# Patient Record
Sex: FEMALE | Race: WHITE | NOT HISPANIC OR LATINO | Employment: OTHER | URBAN - METROPOLITAN AREA
[De-identification: names, ages, dates, MRNs, and addresses within clinical notes are randomized per-mention and may not be internally consistent; named-entity substitution may affect disease eponyms.]

---

## 2019-08-01 RX ORDER — FLUOXETINE HYDROCHLORIDE 20 MG/1
CAPSULE ORAL
Qty: 90 CAPSULE | Refills: 0 | Status: SHIPPED | OUTPATIENT
Start: 2019-08-01

## 2019-10-28 RX ORDER — FLUOXETINE HYDROCHLORIDE 20 MG/1
CAPSULE ORAL
Qty: 90 CAPSULE | Refills: 0 | OUTPATIENT
Start: 2019-10-28

## 2023-05-01 ENCOUNTER — APPOINTMENT (OUTPATIENT)
Dept: GENERAL RADIOLOGY | Facility: HOSPITAL | Age: 83
End: 2023-05-01
Payer: MEDICARE

## 2023-05-01 ENCOUNTER — APPOINTMENT (OUTPATIENT)
Dept: CARDIOLOGY | Facility: HOSPITAL | Age: 83
End: 2023-05-01
Payer: MEDICARE

## 2023-05-01 ENCOUNTER — HOSPITAL ENCOUNTER (OUTPATIENT)
Facility: HOSPITAL | Age: 83
Discharge: HOME | End: 2023-05-03
Attending: EMERGENCY MEDICINE | Admitting: INTERNAL MEDICINE
Payer: MEDICARE

## 2023-05-01 DIAGNOSIS — J90 PLEURAL EFFUSION ON LEFT: ICD-10-CM

## 2023-05-01 DIAGNOSIS — R06.09 DYSPNEA ON EXERTION: Primary | ICD-10-CM

## 2023-05-01 DIAGNOSIS — I35.0 AORTIC STENOSIS, SEVERE: ICD-10-CM

## 2023-05-01 LAB
ALBUMIN SERPL-MCNC: 3.8 G/DL (ref 3.5–5.2)
ALBUMIN/GLOB SERPL: 1.6 G/DL
ALP SERPL-CCNC: 103 U/L (ref 39–117)
ALT SERPL W P-5'-P-CCNC: 17 U/L (ref 1–33)
ANION GAP SERPL CALCULATED.3IONS-SCNC: 11.7 MMOL/L (ref 5–15)
AST SERPL-CCNC: 24 U/L (ref 1–32)
BASOPHILS # BLD AUTO: 0.05 10*3/MM3 (ref 0–0.2)
BASOPHILS NFR BLD AUTO: 1 % (ref 0–1.5)
BILIRUB SERPL-MCNC: 0.4 MG/DL (ref 0–1.2)
BUN SERPL-MCNC: 9 MG/DL (ref 8–23)
BUN/CREAT SERPL: 13.6 (ref 7–25)
CALCIUM SPEC-SCNC: 9.4 MG/DL (ref 8.6–10.5)
CHLORIDE SERPL-SCNC: 106 MMOL/L (ref 98–107)
CHOLEST SERPL-MCNC: 157 MG/DL (ref 0–200)
CO2 SERPL-SCNC: 26.3 MMOL/L (ref 22–29)
CREAT SERPL-MCNC: 0.66 MG/DL (ref 0.57–1)
DEPRECATED RDW RBC AUTO: 44.8 FL (ref 37–54)
EGFRCR SERPLBLD CKD-EPI 2021: 86.6 ML/MIN/1.73
EOSINOPHIL # BLD AUTO: 0.2 10*3/MM3 (ref 0–0.4)
EOSINOPHIL NFR BLD AUTO: 3.8 % (ref 0.3–6.2)
ERYTHROCYTE [DISTWIDTH] IN BLOOD BY AUTOMATED COUNT: 13.1 % (ref 12.3–15.4)
GEN 5 2HR TROPONIN T REFLEX: 23 NG/L
GLOBULIN UR ELPH-MCNC: 2.4 GM/DL
GLUCOSE SERPL-MCNC: 89 MG/DL (ref 65–99)
HCT VFR BLD AUTO: 41.8 % (ref 34–46.6)
HDLC SERPL-MCNC: 50 MG/DL (ref 40–60)
HGB BLD-MCNC: 14.2 G/DL (ref 12–15.9)
IMM GRANULOCYTES # BLD AUTO: 0.01 10*3/MM3 (ref 0–0.05)
IMM GRANULOCYTES NFR BLD AUTO: 0.2 % (ref 0–0.5)
LDLC SERPL CALC-MCNC: 96 MG/DL (ref 0–100)
LDLC/HDLC SERPL: 1.93 {RATIO}
LYMPHOCYTES # BLD AUTO: 1.24 10*3/MM3 (ref 0.7–3.1)
LYMPHOCYTES NFR BLD AUTO: 23.7 % (ref 19.6–45.3)
MCH RBC QN AUTO: 31.5 PG (ref 26.6–33)
MCHC RBC AUTO-ENTMCNC: 34 G/DL (ref 31.5–35.7)
MCV RBC AUTO: 92.7 FL (ref 79–97)
MONOCYTES # BLD AUTO: 0.56 10*3/MM3 (ref 0.1–0.9)
MONOCYTES NFR BLD AUTO: 10.7 % (ref 5–12)
NEUTROPHILS NFR BLD AUTO: 3.18 10*3/MM3 (ref 1.7–7)
NEUTROPHILS NFR BLD AUTO: 60.6 % (ref 42.7–76)
NRBC BLD AUTO-RTO: 0 /100 WBC (ref 0–0.2)
NT-PROBNP SERPL-MCNC: 4483 PG/ML (ref 0–1800)
PLATELET # BLD AUTO: 281 10*3/MM3 (ref 140–450)
PMV BLD AUTO: 9.3 FL (ref 6–12)
POTASSIUM SERPL-SCNC: 4.4 MMOL/L (ref 3.5–5.2)
PROT SERPL-MCNC: 6.2 G/DL (ref 6–8.5)
QT INTERVAL: 417 MS
QT INTERVAL: 418 MS
RBC # BLD AUTO: 4.51 10*6/MM3 (ref 3.77–5.28)
SODIUM SERPL-SCNC: 144 MMOL/L (ref 136–145)
TRIGL SERPL-MCNC: 52 MG/DL (ref 0–150)
TROPONIN T DELTA: 0 NG/L
TROPONIN T SERPL HS-MCNC: 23 NG/L
TSH SERPL DL<=0.05 MIU/L-ACNC: 0.23 UIU/ML (ref 0.27–4.2)
VLDLC SERPL-MCNC: 11 MG/DL (ref 5–40)
WBC NRBC COR # BLD: 5.24 10*3/MM3 (ref 3.4–10.8)

## 2023-05-01 PROCEDURE — 96376 TX/PRO/DX INJ SAME DRUG ADON: CPT

## 2023-05-01 PROCEDURE — 93306 TTE W/DOPPLER COMPLETE: CPT

## 2023-05-01 PROCEDURE — G0378 HOSPITAL OBSERVATION PER HR: HCPCS

## 2023-05-01 PROCEDURE — 84484 ASSAY OF TROPONIN QUANT: CPT | Performed by: EMERGENCY MEDICINE

## 2023-05-01 PROCEDURE — 93005 ELECTROCARDIOGRAM TRACING: CPT

## 2023-05-01 PROCEDURE — 96374 THER/PROPH/DIAG INJ IV PUSH: CPT

## 2023-05-01 PROCEDURE — 80061 LIPID PANEL: CPT | Performed by: NURSE PRACTITIONER

## 2023-05-01 PROCEDURE — 36415 COLL VENOUS BLD VENIPUNCTURE: CPT | Performed by: EMERGENCY MEDICINE

## 2023-05-01 PROCEDURE — 93306 TTE W/DOPPLER COMPLETE: CPT | Performed by: INTERNAL MEDICINE

## 2023-05-01 PROCEDURE — 85025 COMPLETE CBC W/AUTO DIFF WBC: CPT | Performed by: EMERGENCY MEDICINE

## 2023-05-01 PROCEDURE — 99284 EMERGENCY DEPT VISIT MOD MDM: CPT

## 2023-05-01 PROCEDURE — 93010 ELECTROCARDIOGRAM REPORT: CPT | Performed by: INTERNAL MEDICINE

## 2023-05-01 PROCEDURE — 71045 X-RAY EXAM CHEST 1 VIEW: CPT

## 2023-05-01 PROCEDURE — 93005 ELECTROCARDIOGRAM TRACING: CPT | Performed by: NURSE PRACTITIONER

## 2023-05-01 PROCEDURE — 80053 COMPREHEN METABOLIC PANEL: CPT | Performed by: EMERGENCY MEDICINE

## 2023-05-01 PROCEDURE — 25010000002 FUROSEMIDE PER 20 MG: Performed by: NURSE PRACTITIONER

## 2023-05-01 PROCEDURE — 84443 ASSAY THYROID STIM HORMONE: CPT | Performed by: NURSE PRACTITIONER

## 2023-05-01 PROCEDURE — 83880 ASSAY OF NATRIURETIC PEPTIDE: CPT | Performed by: EMERGENCY MEDICINE

## 2023-05-01 RX ORDER — FLUOXETINE HYDROCHLORIDE 20 MG/1
20 CAPSULE ORAL DAILY
Status: DISCONTINUED | OUTPATIENT
Start: 2023-05-01 | End: 2023-05-03 | Stop reason: HOSPADM

## 2023-05-01 RX ORDER — GABAPENTIN 100 MG/1
300 CAPSULE ORAL EVERY 12 HOURS SCHEDULED
Status: DISCONTINUED | OUTPATIENT
Start: 2023-05-01 | End: 2023-05-03 | Stop reason: HOSPADM

## 2023-05-01 RX ORDER — SODIUM CHLORIDE 0.9 % (FLUSH) 0.9 %
10 SYRINGE (ML) INJECTION EVERY 12 HOURS SCHEDULED
Status: DISCONTINUED | OUTPATIENT
Start: 2023-05-01 | End: 2023-05-03 | Stop reason: HOSPADM

## 2023-05-01 RX ORDER — SODIUM CHLORIDE 0.9 % (FLUSH) 0.9 %
10 SYRINGE (ML) INJECTION AS NEEDED
Status: DISCONTINUED | OUTPATIENT
Start: 2023-05-01 | End: 2023-05-03 | Stop reason: HOSPADM

## 2023-05-01 RX ORDER — GABAPENTIN 300 MG/1
300 CAPSULE ORAL 3 TIMES DAILY
COMMUNITY
Start: 2023-04-06

## 2023-05-01 RX ORDER — LEVOTHYROXINE SODIUM 0.03 MG/1
25 TABLET ORAL
Status: DISCONTINUED | OUTPATIENT
Start: 2023-05-02 | End: 2023-05-03 | Stop reason: HOSPADM

## 2023-05-01 RX ORDER — FUROSEMIDE 10 MG/ML
80 INJECTION INTRAMUSCULAR; INTRAVENOUS ONCE
Status: COMPLETED | OUTPATIENT
Start: 2023-05-01 | End: 2023-05-01

## 2023-05-01 RX ORDER — VALACYCLOVIR HYDROCHLORIDE 500 MG/1
500 TABLET, FILM COATED ORAL DAILY
COMMUNITY
Start: 2023-01-12

## 2023-05-01 RX ORDER — FUROSEMIDE 10 MG/ML
40 INJECTION INTRAMUSCULAR; INTRAVENOUS EVERY 12 HOURS
Status: DISCONTINUED | OUTPATIENT
Start: 2023-05-01 | End: 2023-05-03

## 2023-05-01 RX ORDER — LEVOTHYROXINE SODIUM 0.05 MG/1
50 TABLET ORAL
Status: DISCONTINUED | OUTPATIENT
Start: 2023-05-02 | End: 2023-05-01

## 2023-05-01 RX ORDER — LIOTHYRONINE SODIUM 5 UG/1
TABLET ORAL
COMMUNITY
Start: 2023-02-27

## 2023-05-01 RX ORDER — ASPIRIN 81 MG/1
81 TABLET ORAL DAILY
Status: DISCONTINUED | OUTPATIENT
Start: 2023-05-02 | End: 2023-05-03 | Stop reason: HOSPADM

## 2023-05-01 RX ORDER — LEVOTHYROXINE SODIUM 0.03 MG/1
50 TABLET ORAL DAILY
Qty: 60 TABLET | Refills: 2 | COMMUNITY
Start: 2023-03-17 | End: 2023-06-15

## 2023-05-01 RX ORDER — SODIUM CHLORIDE 9 MG/ML
40 INJECTION, SOLUTION INTRAVENOUS AS NEEDED
Status: DISCONTINUED | OUTPATIENT
Start: 2023-05-01 | End: 2023-05-03 | Stop reason: HOSPADM

## 2023-05-01 RX ORDER — ASPIRIN 81 MG/1
324 TABLET, CHEWABLE ORAL ONCE
Status: COMPLETED | OUTPATIENT
Start: 2023-05-01 | End: 2023-05-01

## 2023-05-01 RX ADMIN — ASPIRIN 324 MG: 81 TABLET, CHEWABLE ORAL at 11:07

## 2023-05-01 RX ADMIN — FUROSEMIDE 80 MG: 10 INJECTION, SOLUTION INTRAMUSCULAR; INTRAVENOUS at 11:05

## 2023-05-01 RX ADMIN — GABAPENTIN 300 MG: 300 CAPSULE ORAL at 21:02

## 2023-05-01 RX ADMIN — Medication 10 ML: at 21:02

## 2023-05-01 RX ADMIN — FUROSEMIDE 40 MG: 10 INJECTION, SOLUTION INTRAMUSCULAR; INTRAVENOUS at 21:02

## 2023-05-01 RX ADMIN — Medication 10 ML: at 11:02

## 2023-05-01 NOTE — Clinical Note
A 6 fr sheath was  inserted using micropuncture technique with ultrasound guidance into the right radial artery.

## 2023-05-01 NOTE — H&P
[facility]   HISTORY AND PHYSICAL    Patient Name: Sigrid Hobson  : 1940  MRN: [id number]  Primary Care Physician:  Aydin Willams MD  Date of admission: 2023    Subjective   Subjective     Chief Complaint:   Chief Complaint   Patient presents with   • Shortness of Breath         HPI:    Sigrid Hobson is a pleasant afebrile ambulatory 82 y.o.  female with past medical history of hypothyroidism and hypertension.     She presents to the emergency department [facility] today with complaint of dyspnea with exertion over the past 2 weeks.  She has been admitted to the ED observation unit for further testing and evaluation.    Patient describes when she is ambulating short distances that she is noted that she has trouble breathing.  She denies any cough, fever or chills.  She states that her nephew who she was very close with passed away in December and since January she endorses orthopnea.  She denies any peripheral edema but on physical exam notes to have some mild pitting edema from her socks she denies any abdominal distention or obvious weight gain. She advises that she wants to be a DNR if needed.       Review of Systems   All systems were reviewed and negative except for: exertional dyspnea    Personal History     No past medical history on file.    No past surgical history on file.    Family History: family history is not on file. Otherwise pertinent FHx was reviewed and not pertinent to current issue.    Social History:  reports that she has never smoked. She has never been exposed to tobacco smoke. She has never used smokeless tobacco. She reports that she does not drink alcohol and does not use drugs.    Home Medications:  FLUoxetine, gabapentin, levothyroxine, liothyronine, and valACYclovir    Allergies:  Allergies   Allergen Reactions   • Milk-Related Compounds Diarrhea     SEVERE DIARRHEA       Objective   Objective     Vitals:   Temp:  [96.6 °F (35.9 °C)]  96.6 °F (35.9 °C)  Heart Rate:  [] 83  Resp:  [20] 20  BP: (165-174)/() 174/101  Physical Exam    Constitutional: Awake, alert   Eyes: PERRLA, sclerae anicteric, no conjunctival injection   HENT: NCAT, mucous membranes moist   Neck: Supple, no thyromegaly, no lymphadenopathy, trachea midline   Respiratory: Clear to auscultation bilaterally, nonlabored respirations    Cardiovascular: RRR, + murmurs, rubs, or gallops, palpable pedal pulses bilaterally   Gastrointestinal: Positive bowel sounds, soft, nontender, nondistended   Musculoskeletal: No clubbing or cyanosis to extremities, 1+ pitting BLE   Psychiatric: Appropriate affect, cooperative   Neurologic: Oriented x 3, strength symmetric in all extremities, Cranial Nerves grossly intact to confrontation, speech clear, resting tremor   Skin: No rashes     Result Review    Result Review:  I have personally reviewed the results from the time of this admission to 5/1/2023 11:38 EDT and agree with these findings:  [x]  Laboratory list / accordion  []  Microbiology  [x]  Radiology  [x]  EKG/Telemetry   []  Cardiology/Vascular   []  Pathology  []  Old records  []  Other:  Most notable findings include: Chest x-ray shows left-sided pleural effusion, cardiac size upper limit of normal to mildly enlarged, EKG shows sinus rhythm rate of 82, QTc 49, poor R wave progression in anterior leads without any reciprocal changes, no overt evidence of acute ischemia      Assessment & Plan   Assessment / Plan     Brief Patient Summary:  Sigrid Hobson is a 82 y.o. female who is being evaluated for exertional dyspnea with an elevated troponin and BNP    Active Hospital Problems:  Active Hospital Problems    Diagnosis    • **Dyspnea on exertion      Plan:     Exertional dyspnea  High-sensitivity troponin 23, trend  Consult to cardiology  Echocardiogram pending  Lasix 80 mg iv x 1  Strict I&O  Daily weights  Consult to physical therapy  Cardiac diet, n.p.o. after  midnight    Hypertension/hypothyroidism  TSH low at 0.235, levothyroxine dose decreased to 25 mcg daily  Vitals every 4 hours per nursing      DVT prophylaxis:  Mechanical DVT prophylaxis orders are present.    CODE STATUS:    Level Of Support Discussed With: Patient  Code Status (Patient has no pulse and is not breathing): No CPR (Do Not Attempt to Resuscitate)  Medical Interventions (Patient has pulse or is breathing): Full Support    Admission Status:  I believe this patient meets observation status.    Electronically signed by NORBERTO Razo, 05/01/23, 11:38 AM EDT.      I have worn appropriate PPE during this patient encounter, sanitized my hands both with entering and exiting patient's room.    80 minutes has been spent by [facility] providers in the care of this patient while under observation status    I have discussed plan of care with patient including advance care plan and/or surrogate decision maker.  Patient advises that their son Robert will be their primary surrogate decision maker

## 2023-05-01 NOTE — Clinical Note
Hemostasis started on the right radial artery. Manual pressure applied to vessel. Manual pressure was held by CC. Manual pressure was held for 5 min. Hemostasis achieved successfully. Closure device additional comment: Tensoplast

## 2023-05-01 NOTE — ED TRIAGE NOTES
Patient to ED via PV. Patient reports exertional SOA x 2 weeks. Patient denies cough, fevers, or chest pain.

## 2023-05-01 NOTE — ED PROVIDER NOTES
EMERGENCY DEPARTMENT ENCOUNTER    Room Number:  39/39  Date seen:  5/1/2023  PCP: Aydin Willams MD  Historian: Patient      HPI:  Chief Complaint: Short of breath  A complete HPI/ROS/PMH/PSH/SH/FH are unobtainable due to: Nothing  Context: Sigrid Hobson is a 82 y.o. female who presents to the ED c/o shortness of breath with exertion that she has noticed over the last couple of weeks.  Patient reports that when she walks about a quarter of a mile she will start to get little winded and have to sit down and rest.  She denies chest pain associated with this.  Patient reports that she previously was able to walk to the [employer] down the street from her home with no difficulty.  This is just become a problem in the last couple weeks.  She denies significant dyspnea on exertion when doing light duties around her home.  She typically sleeps on her side.  She has not noticed any new orthopnea or PND symptoms.  She denies history of CHF.  No history of COPD.  She denies leg pain or leg swelling.  No history of blood clots.    No cough, fever, chills.        PAST MEDICAL HISTORY  Active Ambulatory Problems     Diagnosis Date Noted   • No Active Ambulatory Problems     Resolved Ambulatory Problems     Diagnosis Date Noted   • No Resolved Ambulatory Problems     No Additional Past Medical History         PAST SURGICAL HISTORY  No past surgical history on file.      FAMILY HISTORY  No family history on file.      SOCIAL HISTORY  Social History     Socioeconomic History   • Marital status:          ALLERGIES  Patient has no allergy information on record.        REVIEW OF SYSTEMS  Review of Systems   Review of all 14 systems is negative other than stated in the HPI above.      PHYSICAL EXAM  ED Triage Vitals   Temp Heart Rate Resp BP SpO2   05/01/23 0842 05/01/23 0842 05/01/23 0842 05/01/23 0901 05/01/23 0842   96.6 °F (35.9 °C) 109 20 165/96 97 %      Temp src Heart Rate Source Patient Position BP Location FiO2 (%)    -- -- -- -- --              Physical Exam      GENERAL: Awake and alert, well-appearing, no acute distress  HENT: nares patent  EYES: no scleral icterus, EOMI  CV: regular rhythm, normal rate, no murmur appreciated, no peripheral edema  RESPIRATORY: normal effort, lungs clear to auscultation bilaterally  ABDOMEN: soft, nontender throughout  MUSCULOSKELETAL: no deformity, no calf tenderness present bilaterally  NEURO: alert, moves all extremities, follows commands, cranial nerves II through XII grossly intact  PSYCH:  calm, cooperative  SKIN: warm, dry    Vital signs and nursing notes reviewed.          LAB RESULTS  Recent Results (from the past 24 hour(s))   ECG 12 Lead Dyspnea    Collection Time: 05/01/23  9:08 AM   Result Value Ref Range    QT Interval 418 ms   Comprehensive Metabolic Panel    Collection Time: 05/01/23  9:14 AM    Specimen: Blood   Result Value Ref Range    Glucose 89 65 - 99 mg/dL    BUN 9 8 - 23 mg/dL    Creatinine 0.66 0.57 - 1.00 mg/dL    Sodium 144 136 - 145 mmol/L    Potassium 4.4 3.5 - 5.2 mmol/L    Chloride 106 98 - 107 mmol/L    CO2 26.3 22.0 - 29.0 mmol/L    Calcium 9.4 8.6 - 10.5 mg/dL    Total Protein 6.2 6.0 - 8.5 g/dL    Albumin 3.8 3.5 - 5.2 g/dL    ALT (SGPT) 17 1 - 33 U/L    AST (SGOT) 24 1 - 32 U/L    Alkaline Phosphatase 103 39 - 117 U/L    Total Bilirubin 0.4 0.0 - 1.2 mg/dL    Globulin 2.4 gm/dL    A/G Ratio 1.6 g/dL    BUN/Creatinine Ratio 13.6 7.0 - 25.0    Anion Gap 11.7 5.0 - 15.0 mmol/L    eGFR 86.6 >60.0 mL/min/1.73   BNP    Collection Time: 05/01/23  9:14 AM    Specimen: Blood   Result Value Ref Range    proBNP 4,483.0 (H) 0.0 - 1,800.0 pg/mL   High Sensitivity Troponin T    Collection Time: 05/01/23  9:14 AM    Specimen: Blood   Result Value Ref Range    HS Troponin T 23 (H) <10 ng/L   CBC Auto Differential    Collection Time: 05/01/23  9:14 AM    Specimen: Blood   Result Value Ref Range    WBC 5.24 3.40 - 10.80 10*3/mm3    RBC 4.51 3.77 - 5.28 10*6/mm3    Hemoglobin  14.2 12.0 - 15.9 g/dL    Hematocrit 41.8 34.0 - 46.6 %    MCV 92.7 79.0 - 97.0 fL    MCH 31.5 26.6 - 33.0 pg    MCHC 34.0 31.5 - 35.7 g/dL    RDW 13.1 12.3 - 15.4 %    RDW-SD 44.8 37.0 - 54.0 fl    MPV 9.3 6.0 - 12.0 fL    Platelets 281 140 - 450 10*3/mm3    Neutrophil % 60.6 42.7 - 76.0 %    Lymphocyte % 23.7 19.6 - 45.3 %    Monocyte % 10.7 5.0 - 12.0 %    Eosinophil % 3.8 0.3 - 6.2 %    Basophil % 1.0 0.0 - 1.5 %    Immature Grans % 0.2 0.0 - 0.5 %    Neutrophils, Absolute 3.18 1.70 - 7.00 10*3/mm3    Lymphocytes, Absolute 1.24 0.70 - 3.10 10*3/mm3    Monocytes, Absolute 0.56 0.10 - 0.90 10*3/mm3    Eosinophils, Absolute 0.20 0.00 - 0.40 10*3/mm3    Basophils, Absolute 0.05 0.00 - 0.20 10*3/mm3    Immature Grans, Absolute 0.01 0.00 - 0.05 10*3/mm3    nRBC 0.0 0.0 - 0.2 /100 WBC       Ordered the above labs and reviewed the results.        RADIOLOGY  XR Chest 1 View    Result Date: 5/1/2023  XR CHEST 1 VW-clinical: Dyspnea on exertion  FINDINGS: Small moderate size left pleural effusion. Cardiac size upper limits of normal to mildly enlarged. No gross pulmonary edema. No acute airspace disease identified.  CONCLUSION: Left-sided pleural effusion.  This report was finalized on 5/1/2023 9:35 AM by Dr. Sea Castro M.D.        Ordered the above noted radiological studies. Reviewed by me in PACS.            PROCEDURES  Procedures              MEDICATIONS GIVEN IN ER  Medications   sodium chloride 0.9 % flush 10 mL (has no administration in time range)                   MEDICAL DECISION MAKING, PROGRESS, and CONSULTS    All labs have been independently reviewed by me.  All radiology studies have been reviewed by me and I have also reviewed the radiology report.   EKG's independently viewed and interpreted by me.  Discussion below represents my analysis of pertinent findings related to patient's condition, differential diagnosis, treatment plan and final disposition.      Additional sources:  - Discussed/ obtained  information from independent historians: N/A    - External (non-ED) record review: I reviewed PCP office visit with Dr. Willams from 10/27/2022.  Patient was seen for follow-up of hypothyroidism, acute sinusitis, hypertension.    - Chronic or social conditions impacting care: N/A    - Shared decision making: Patient informed of plan for admission for further evaluation of new dyspnea on exertion and left pleural effusion.      Orders placed during this visit:  Orders Placed This Encounter   Procedures   • XR Chest 1 View   • Comprehensive Metabolic Panel   • BNP   • High Sensitivity Troponin T   • CBC Auto Differential   • High Sensitivity Troponin T 2Hr   • Cardiac Monitoring   • Pulse Oximetry, Continuous   • Monitor Blood Pressure   • ECG 12 Lead Dyspnea   • Insert Peripheral IV   • Initiate ED Observation Status   • CBC & Differential               Differential diagnosis includes but is not limited to:    Pneumonia  CHF  COPD  Anemia  Unstable angina        Independent interpretation of labs, radiology studies, and discussions with consultants:  ED Course as of 05/01/23 1047   Mon May 01, 2023   0942 Chest x-ray independently interpreted in PACS.  There is a small to moderate left pleural effusion.  No pulmonary edema. [JR]   0942 EKG          EKG time: 908  Rhythm/Rate: Sinus rhythm, 82  P waves and IL: Normal  QRS, axis: Normal axis, LVH  ST and T waves: Borderline ST depression laterally    Interpreted Contemporaneously by me, independently viewed  No previous for comparison       [JR]   1028 HS Troponin T(!): 23 [JR]   1028 proBNP(!): 4,483.0 [JR]   1046 Discussed with NORBERTO Razo in the ED observation unit, who agrees to admit on behalf of Dr. Coombs for further evaluation of dyspnea on exertion, new left-sided pleural effusion. [JR]      ED Course User Index  [JR] David Crandall MD               DIAGNOSIS  Final diagnoses:   Dyspnea on exertion   Pleural effusion on left          DISPOSITION  Admit            Latest Documented Vital Signs:  As of 10:47 EDT  BP- (!) 174/101 HR- 83 Temp- 96.6 °F (35.9 °C) O2 sat- 96%              --    Please note that portions of this were completed with a voice recognition program.       Note Disclaimer: At [facility], we believe that sharing information builds trust and better relationships. You are receiving this note because you are receiving care at [facility] or recently visited. It is possible you will see health information before a provider has talked with you about it. This kind of information can be easy to misunderstand. To help you fully understand what it means for your health, we urge you to discuss this note with your provider.           David Crandall MD  05/01/23 1048

## 2023-05-01 NOTE — ED NOTES
"Nursing report ED to floor  Sigrid Hobson  82 y.o.  female    HPI :   Chief Complaint   Patient presents with    Shortness of Breath       Admitting doctor:   Ronni Coombs II, MD    Admitting diagnosis:   The primary encounter diagnosis was Dyspnea on exertion. A diagnosis of Pleural effusion on left was also pertinent to this visit.    Code status:   Current Code Status       Date Active Code Status Order ID Comments User Context       Not on file            Allergies:   Patient has no allergy information on record.    Isolation:   No active isolations    Intake and Output  No intake or output data in the 24 hours ending 05/01/23 1051    Weight:       05/01/23  0901   Weight: 63.9 kg (140 lb 14 oz)       Most recent vitals:   Vitals:    05/01/23 0904 05/01/23 1019 05/01/23 1020 05/01/23 1021   BP:   (!) 174/101    Pulse:  84 80 83   Resp:       Temp:       SpO2:  97% 97% 96%   Weight:       Height: 152.4 cm (60\")          Active LDAs/IV Access:   Lines, Drains & Airways       Active LDAs       Name Placement date Placement time Site Days    Peripheral IV 05/01/23 0902 Right Antecubital 05/01/23  0902  Antecubital  less than 1                    Labs (abnormal labs have a star):   Labs Reviewed   BNP (IN-HOUSE) - Abnormal; Notable for the following components:       Result Value    proBNP 4,483.0 (*)     All other components within normal limits    Narrative:     Among patients with dyspnea, NT-proBNP is highly sensitive for the detection of acute congestive heart failure. In addition NT-proBNP of <300 pg/ml effectively rules out acute congestive heart failure with 99% negative predictive value.    Results may be falsely decreased if patient taking Biotin.     TROPONIN - Abnormal; Notable for the following components:    HS Troponin T 23 (*)     All other components within normal limits    Narrative:     High Sensitive Troponin T Reference Range:  <10.0 ng/L- Negative Female for AMI  <15.0 ng/L- Negative " Male for AMI  >=10 - Abnormal Female indicating possible myocardial injury.  >=15 - Abnormal Male indicating possible myocardial injury.   Clinicians would have to utilize clinical acumen, EKG, Troponin, and serial changes to determine if it is an Acute Myocardial Infarction or myocardial injury due to an underlying chronic condition.        CBC WITH AUTO DIFFERENTIAL - Normal   COMPREHENSIVE METABOLIC PANEL    Narrative:     GFR Normal >60  Chronic Kidney Disease <60  Kidney Failure <15    The GFR formula is only valid for adults with stable renal function between ages 18 and 70.   HIGH SENSITIVITIY TROPONIN T 2HR   LIPID PANEL   CBC AND DIFFERENTIAL    Narrative:     The following orders were created for panel order CBC & Differential.  Procedure                               Abnormality         Status                     ---------                               -----------         ------                     CBC Auto Differential[[id number]]        Normal              Final result                 Please view results for these tests on the individual orders.       EKG:   ECG 12 Lead Dyspnea   Final Result   HEART RATE= 82  bpm   RR Interval= 732  ms   NE Interval= 169  ms   P Horizontal Axis= -27  deg   P Front Axis= -12  deg   QRSD Interval= 100  ms   QT Interval= 418  ms   QRS Axis= 21  deg   T Wave Axis= 70  deg   - ABNORMAL ECG -   Sinus rhythm   Probable LVH with secondary repol abnrm   Anterior ST elevation, probably due to LVH   Borderline prolonged QT interval   No Prior Tracing for Comparison   Electronically Signed By: Denita Calloway ([facility]) 01-May-2023 10:39:12   Date and Time of Study: 2023-05-01 09:08:49      ECG 12 Lead    (Results Pending)   ECG 12 Lead    (Results Pending)   ECG 12 Lead    (Results Pending)       Meds given in ED:   Medications   sodium chloride 0.9 % flush 10 mL (has no administration in time range)   sodium chloride 0.9 % flush 10 mL (has no administration in time range)   sodium  chloride 0.9 % flush 10 mL (has no administration in time range)   sodium chloride 0.9 % infusion 40 mL (has no administration in time range)   aspirin chewable tablet 324 mg (has no administration in time range)     And   aspirin EC tablet 81 mg (has no administration in time range)   furosemide (LASIX) injection 80 mg (has no administration in time range)       Imaging results:  No radiology results for the last day    Ambulatory status:   - ax1    Social issues:   Social History     Socioeconomic History    Marital status:        NIH Stroke Scale:         Jesus Mirza RN  05/01/23 10:51 EDT

## 2023-05-02 PROBLEM — I35.0 AORTIC STENOSIS, SEVERE: Status: ACTIVE | Noted: 2023-05-01

## 2023-05-02 LAB
ANION GAP SERPL CALCULATED.3IONS-SCNC: 13 MMOL/L (ref 5–15)
AORTIC DIMENSIONLESS INDEX: 0.2 (DI)
BH CV ECHO MEAS - AI P1/2T: 452.6 MSEC
BH CV ECHO MEAS - AO MAX PG: 66 MMHG
BH CV ECHO MEAS - AO MEAN PG: 44 MMHG
BH CV ECHO MEAS - AO ROOT DIAM: 2.7 CM
BH CV ECHO MEAS - AO V2 MAX: 407.6 CM/SEC
BH CV ECHO MEAS - AO V2 VTI: 101.2 CM
BH CV ECHO MEAS - AVA(I,D): 0.46 CM2
BH CV ECHO MEAS - EDV(CUBED): 121.4 ML
BH CV ECHO MEAS - EDV(MOD-SP2): 95 ML
BH CV ECHO MEAS - EDV(MOD-SP4): 87 ML
BH CV ECHO MEAS - EF(MOD-BP): 56.5 %
BH CV ECHO MEAS - EF(MOD-SP2): 54.7 %
BH CV ECHO MEAS - EF(MOD-SP4): 54 %
BH CV ECHO MEAS - ESV(CUBED): 40.5 ML
BH CV ECHO MEAS - ESV(MOD-SP2): 43 ML
BH CV ECHO MEAS - ESV(MOD-SP4): 40 ML
BH CV ECHO MEAS - FS: 30.6 %
BH CV ECHO MEAS - IVS/LVPW: 0.94 CM
BH CV ECHO MEAS - IVSD: 1.07 CM
BH CV ECHO MEAS - LAT PEAK E' VEL: 6.4 CM/SEC
BH CV ECHO MEAS - LV DIASTOLIC VOL/BSA (35-75): 54.7 CM2
BH CV ECHO MEAS - LV MASS(C)D: 203.9 GRAMS
BH CV ECHO MEAS - LV MAX PG: 2.46 MMHG
BH CV ECHO MEAS - LV MEAN PG: 1.38 MMHG
BH CV ECHO MEAS - LV SYSTOLIC VOL/BSA (12-30): 25.2 CM2
BH CV ECHO MEAS - LV V1 MAX: 78.3 CM/SEC
BH CV ECHO MEAS - LV V1 VTI: 19.7 CM
BH CV ECHO MEAS - LVIDD: 5 CM
BH CV ECHO MEAS - LVIDS: 3.4 CM
BH CV ECHO MEAS - LVOT AREA: 2.34 CM2
BH CV ECHO MEAS - LVOT DIAM: 1.73 CM
BH CV ECHO MEAS - LVPWD: 1.13 CM
BH CV ECHO MEAS - MED PEAK E' VEL: 4.4 CM/SEC
BH CV ECHO MEAS - MR MAX PG: 191 MMHG
BH CV ECHO MEAS - MR MAX VEL: 691 CM/SEC
BH CV ECHO MEAS - MV A DUR: 0.12 SEC
BH CV ECHO MEAS - MV A MAX VEL: 118.1 CM/SEC
BH CV ECHO MEAS - MV DEC SLOPE: 615 CM/SEC2
BH CV ECHO MEAS - MV DEC TIME: 219 MSEC
BH CV ECHO MEAS - MV E MAX VEL: 132 CM/SEC
BH CV ECHO MEAS - MV E/A: 1.12
BH CV ECHO MEAS - MV MAX PG: 7.4 MMHG
BH CV ECHO MEAS - MV MEAN PG: 3.6 MMHG
BH CV ECHO MEAS - MV P1/2T: 65.2 MSEC
BH CV ECHO MEAS - MV V2 VTI: 34.2 CM
BH CV ECHO MEAS - MVA(P1/2T): 3.4 CM2
BH CV ECHO MEAS - MVA(VTI): 1.35 CM2
BH CV ECHO MEAS - PA ACC TIME: 0.12 SEC
BH CV ECHO MEAS - PA PR(ACCEL): 23.1 MMHG
BH CV ECHO MEAS - PA V2 MAX: 134.4 CM/SEC
BH CV ECHO MEAS - PULM A REVS DUR: 0.09 SEC
BH CV ECHO MEAS - PULM A REVS VEL: 32.6 CM/SEC
BH CV ECHO MEAS - PULM DIAS VEL: 52.3 CM/SEC
BH CV ECHO MEAS - PULM S/D: 0.89
BH CV ECHO MEAS - PULM SYS VEL: 46.4 CM/SEC
BH CV ECHO MEAS - RAP SYSTOLE: 3 MMHG
BH CV ECHO MEAS - RV MAX PG: 3.3 MMHG
BH CV ECHO MEAS - RV V1 MAX: 90.6 CM/SEC
BH CV ECHO MEAS - RV V1 VTI: 20.6 CM
BH CV ECHO MEAS - RVSP: 31 MMHG
BH CV ECHO MEAS - SI(MOD-SP2): 32.7 ML/M2
BH CV ECHO MEAS - SI(MOD-SP4): 29.6 ML/M2
BH CV ECHO MEAS - SV(LVOT): 46.2 ML
BH CV ECHO MEAS - SV(MOD-SP2): 52 ML
BH CV ECHO MEAS - SV(MOD-SP4): 47 ML
BH CV ECHO MEAS - TAPSE (>1.6): 1.9 CM
BH CV ECHO MEAS - TR MAX PG: 27.9 MMHG
BH CV ECHO MEAS - TR MAX VEL: 264 CM/SEC
BH CV ECHO MEASUREMENTS AVERAGE E/E' RATIO: 24.44
BH CV XLRA - RV BASE: 2.6 CM
BH CV XLRA - RV LENGTH: 7.6 CM
BH CV XLRA - RV MID: 2.8 CM
BH CV XLRA - TDI S': 12.7 CM/SEC
BUN SERPL-MCNC: 12 MG/DL (ref 8–23)
BUN/CREAT SERPL: 16.2 (ref 7–25)
CALCIUM SPEC-SCNC: 9 MG/DL (ref 8.6–10.5)
CHLORIDE SERPL-SCNC: 102 MMOL/L (ref 98–107)
CO2 SERPL-SCNC: 28 MMOL/L (ref 22–29)
CREAT SERPL-MCNC: 0.74 MG/DL (ref 0.57–1)
DEPRECATED RDW RBC AUTO: 48.7 FL (ref 37–54)
EGFRCR SERPLBLD CKD-EPI 2021: 79.9 ML/MIN/1.73
ERYTHROCYTE [DISTWIDTH] IN BLOOD BY AUTOMATED COUNT: 13.6 % (ref 12.3–15.4)
GLUCOSE SERPL-MCNC: 101 MG/DL (ref 65–99)
HCT VFR BLD AUTO: 43.8 % (ref 34–46.6)
HGB BLD-MCNC: 14.3 G/DL (ref 12–15.9)
MAGNESIUM SERPL-MCNC: 1.9 MG/DL (ref 1.6–2.4)
MAXIMAL PREDICTED HEART RATE: 136 BPM
MCH RBC QN AUTO: 31.2 PG (ref 26.6–33)
MCHC RBC AUTO-ENTMCNC: 32.6 G/DL (ref 31.5–35.7)
MCV RBC AUTO: 95.4 FL (ref 79–97)
PLATELET # BLD AUTO: 278 10*3/MM3 (ref 140–450)
PMV BLD AUTO: 9.6 FL (ref 6–12)
POTASSIUM SERPL-SCNC: 4.1 MMOL/L (ref 3.5–5.2)
QT INTERVAL: 430 MS
RBC # BLD AUTO: 4.59 10*6/MM3 (ref 3.77–5.28)
SODIUM SERPL-SCNC: 143 MMOL/L (ref 136–145)
STRESS TARGET HR: 116 BPM
TROPONIN T SERPL HS-MCNC: 22 NG/L
WBC NRBC COR # BLD: 6.18 10*3/MM3 (ref 3.4–10.8)

## 2023-05-02 PROCEDURE — 85027 COMPLETE CBC AUTOMATED: CPT | Performed by: NURSE PRACTITIONER

## 2023-05-02 PROCEDURE — 84484 ASSAY OF TROPONIN QUANT: CPT | Performed by: NURSE PRACTITIONER

## 2023-05-02 PROCEDURE — G0378 HOSPITAL OBSERVATION PER HR: HCPCS

## 2023-05-02 PROCEDURE — 80048 BASIC METABOLIC PNL TOTAL CA: CPT | Performed by: NURSE PRACTITIONER

## 2023-05-02 PROCEDURE — 93010 ELECTROCARDIOGRAM REPORT: CPT | Performed by: INTERNAL MEDICINE

## 2023-05-02 PROCEDURE — 93005 ELECTROCARDIOGRAM TRACING: CPT | Performed by: NURSE PRACTITIONER

## 2023-05-02 PROCEDURE — 83735 ASSAY OF MAGNESIUM: CPT | Performed by: NURSE PRACTITIONER

## 2023-05-02 PROCEDURE — 25010000002 FUROSEMIDE PER 20 MG: Performed by: NURSE PRACTITIONER

## 2023-05-02 PROCEDURE — 96376 TX/PRO/DX INJ SAME DRUG ADON: CPT

## 2023-05-02 PROCEDURE — 99222 1ST HOSP IP/OBS MODERATE 55: CPT | Performed by: INTERNAL MEDICINE

## 2023-05-02 RX ADMIN — GABAPENTIN 300 MG: 300 CAPSULE ORAL at 20:18

## 2023-05-02 RX ADMIN — Medication 10 ML: at 09:14

## 2023-05-02 RX ADMIN — FUROSEMIDE 40 MG: 10 INJECTION, SOLUTION INTRAMUSCULAR; INTRAVENOUS at 08:00

## 2023-05-02 RX ADMIN — FUROSEMIDE 40 MG: 10 INJECTION, SOLUTION INTRAMUSCULAR; INTRAVENOUS at 20:17

## 2023-05-02 RX ADMIN — Medication 10 ML: at 20:18

## 2023-05-02 RX ADMIN — LEVOTHYROXINE SODIUM 25 MCG: 0.05 TABLET ORAL at 09:13

## 2023-05-02 RX ADMIN — ASPIRIN 81 MG: 81 TABLET, COATED ORAL at 09:13

## 2023-05-02 RX ADMIN — FLUOXETINE HYDROCHLORIDE 20 MG: 20 CAPSULE ORAL at 09:13

## 2023-05-02 RX ADMIN — GABAPENTIN 300 MG: 300 CAPSULE ORAL at 09:14

## 2023-05-02 NOTE — PROGRESS NOTES
HANNAH TALLEY ATTESTATION NOTE    The REMI and I have discussed this patient's history, physical exam, and treatment plan.  I have reviewed the documentation and personally had a face to face interaction with the patient. I affirm the documentation and agree with the treatment and plan.  The attached note describes my personal findings.      I provided a substantive portion of the care of this patient. I personally performed the physical exam, in its entirety.    Sigrid Hobson is a 82 y.o. female who presented to the emergency department yesterday complaining of exertional dyspnea.  She reports it is gotten progressively worse.  She was admitted to the observation unit for further management.  She has had flat troponins.  She has an elevated BNP.  An echo was performed that shows severe aortic stenosis.  Cardiology has evaluated her and wants her to have a cardiac cath tomorrow.  They want to continue IV diuresis for CHF exacerbation.      On exam:  GENERAL: Awake, alert, no acute distress  SKIN: Warm, dry  HENT: Normocephalic, atraumatic  EYES: no scleral icterus  CV: regular rhythm, regular rate  RESPIRATORY: normal effort, lungs clear  ABDOMEN: soft, nontender, nondistended  MUSCULOSKELETAL: no deformity  NEURO: alert, moves all extremities, follows commands        Labs  Recent Results (from the past 24 hour(s))   High Sensitivity Troponin T 2Hr    Collection Time: 05/01/23 12:35 PM    Specimen: Blood   Result Value Ref Range    HS Troponin T 23 (H) <10 ng/L    Troponin T Delta 0 >=-4 - <+4 ng/L   TSH    Collection Time: 05/01/23 12:35 PM    Specimen: Blood   Result Value Ref Range    TSH 0.235 (L) 0.270 - 4.200 uIU/mL   ECG 12 Lead    Collection Time: 05/01/23 12:38 PM   Result Value Ref Range    QT Interval 417 ms   Adult Transthoracic Echo Complete W/ Cont if Necessary Per Protocol    Collection Time: 05/01/23  7:27 PM   Result Value Ref Range    Target HR (85%) 116 bpm    Max. Pred. HR (100%) 136 bpm     EF(MOD-bp) 56.5 %    LVIDd 5.0 cm    LVIDs 3.4 cm    IVSd 1.07 cm    LVPWd 1.13 cm    FS 30.6 %    IVS/LVPW 0.94 cm    ESV(cubed) 40.5 ml    LV Sys Vol (BSA corrected) 25.2 cm2    EDV(cubed) 121.4 ml    LV Peña Vol (BSA corrected) 54.7 cm2    LVOT area 2.34 cm2    LV mass(C)d 203.9 grams    LVOT diam 1.73 cm    EDV(MOD-sp2) 95.0 ml    EDV(MOD-sp4) 87.0 ml    ESV(MOD-sp2) 43.0 ml    ESV(MOD-sp4) 40.0 ml    SV(MOD-sp2) 52.0 ml    SV(MOD-sp4) 47.0 ml    SI(MOD-sp2) 32.7 ml/m2    SI(MOD-sp4) 29.6 ml/m2    EF(MOD-sp2) 54.7 %    EF(MOD-sp4) 54.0 %    MV E max benton 132.0 cm/sec    MV A max benton 118.1 cm/sec    MV dec time 219 msec    MV E/A 1.12     Pulm A Revs Dur 0.09 sec    MV A dur 0.12 sec    Med Peak E' Benton 4.4 cm/sec    Lat Peak E' Benton 6.4 cm/sec    Avg E/e' ratio 24.44     SV(LVOT) 46.2 ml    RV Base 2.6 cm    RV Mid 2.8 cm    RV Length 7.6 cm    RV S' 12.7 cm/sec    Pulm Sys Benton 46.4 cm/sec    Pulm Peña Benton 52.3 cm/sec    Pulm S/D 0.89     Pulm A Revs Benton 32.6 cm/sec    LV V1 max 78.3 cm/sec    LV V1 max PG 2.46 mmHg    LV V1 mean PG 1.38 mmHg    LV V1 VTI 19.7 cm    Ao pk benton 407.6 cm/sec    Ao max PG 66 mmHg    Ao mean PG 44 mmHg    Ao V2 .2 cm    DELPHINE(I,D) 0.46 cm2    AI P1/2t 452.6 msec    MV max PG 7.4 mmHg    MV mean PG 3.6 mmHg    MV V2 VTI 34.2 cm    MV P1/2t 65.2 msec    MVA(P1/2t) 3.4 cm2    MVA(VTI) 1.35 cm2    MV dec slope 615.0 cm/sec2    MR max benton 691.0 cm/sec    MR max .0 mmHg    TR max benton 264.0 cm/sec    TR max PG 27.9 mmHg    RVSP(TR) 31 mmHg    RAP systole 3.0 mmHg    RV V1 max PG 3.3 mmHg    RV V1 max 90.6 cm/sec    RV V1 VTI 20.6 cm    PA V2 max 134.4 cm/sec    PA acc time 0.12 sec    PA pr(Accel) 23.1 mmHg    Ao root diam 2.7 cm    TAPSE (>1.6) 1.90 cm    Dimensionless Index 0.20 (DI)   Basic Metabolic Panel    Collection Time: 05/02/23  4:01 AM    Specimen: Blood   Result Value Ref Range    Glucose 101 (H) 65 - 99 mg/dL    BUN 12 8 - 23 mg/dL    Creatinine 0.74 0.57 - 1.00 mg/dL     Sodium 143 136 - 145 mmol/L    Potassium 4.1 3.5 - 5.2 mmol/L    Chloride 102 98 - 107 mmol/L    CO2 28.0 22.0 - 29.0 mmol/L    Calcium 9.0 8.6 - 10.5 mg/dL    BUN/Creatinine Ratio 16.2 7.0 - 25.0    Anion Gap 13.0 5.0 - 15.0 mmol/L    eGFR 79.9 >60.0 mL/min/1.73   CBC (No Diff)    Collection Time: 05/02/23  4:01 AM    Specimen: Blood   Result Value Ref Range    WBC 6.18 3.40 - 10.80 10*3/mm3    RBC 4.59 3.77 - 5.28 10*6/mm3    Hemoglobin 14.3 12.0 - 15.9 g/dL    Hematocrit 43.8 34.0 - 46.6 %    MCV 95.4 79.0 - 97.0 fL    MCH 31.2 26.6 - 33.0 pg    MCHC 32.6 31.5 - 35.7 g/dL    RDW 13.6 12.3 - 15.4 %    RDW-SD 48.7 37.0 - 54.0 fl    MPV 9.6 6.0 - 12.0 fL    Platelets 278 140 - 450 10*3/mm3   High Sensitivity Troponin T    Collection Time: 05/02/23  4:01 AM    Specimen: Blood   Result Value Ref Range    HS Troponin T 22 (H) <10 ng/L   Magnesium    Collection Time: 05/02/23  4:01 AM    Specimen: Blood   Result Value Ref Range    Magnesium 1.9 1.6 - 2.4 mg/dL   ECG 12 Lead Chest Pain    Collection Time: 05/02/23  4:29 AM   Result Value Ref Range    QT Interval 430 ms       Radiology  Adult Transthoracic Echo Complete W/ Cont if Necessary Per Protocol    Result Date: 5/2/2023  •  Left ventricular systolic function is normal. Left ventricular ejection fraction appears to be 61 - 65%. •  Left ventricular diastolic function is consistent with (grade II w/high LAP) pseudonormalization. •  The left atrial cavity is moderately dilated. •  Severe aortic valve stenosis is present. Aortic valve area is 0.46 cm2. Aortic valve maximum pressure gradient is 66 mmHg. Aortic valve mean pressure gradient is 44 mmHg. Aortic valve dimensionless index is 0.20 . •  Mild aortic valve regurgitation is present. •  There is moderate to severe mitral annular calcification. •  Mild mitral valve regurgitation is present. •  Mild tricuspid valve regurgitation is present. •  Calculated right ventricular systolic pressure from tricuspid  regurgitation is 31 mmHg. •  There is no evidence of pericardial effusion.           Note Disclaimer: At [facility], we believe that sharing information builds trust and better relationships. You are receiving this note because you recently visited [facility]. It is possible you will see health information before a provider has talked with you about it. This kind of information can be easy to misunderstand. To help you fully understand what it means for your health, we urge you to discuss this note with your provider.

## 2023-05-02 NOTE — SIGNIFICANT NOTE
05/02/23 0907   OTHER   Discipline physical therapist   Rehab Time/Intention   Session Not Performed other (see comments)  (checked on pt this am, however, pt denies any PT needs at this time. She has been up in room ad tulio. Reports she is at her baseline. PT will sign off.)

## 2023-05-02 NOTE — PROGRESS NOTES
ED OBSERVATION PROGRESS/DISCHARGE SUMMARY    Date of Admission: 5/1/2023   LOS: 0 days   PCP: Aydin Willams MD    Subjective   No acute events overnight.    Hospital Outcome:   82-year-old female admitted to the observation unit with a complaint of dyspnea with exertion over the past 2 weeks.  High sensitivity serial troponins were 23, 23.  BNP was 4483.  Otherwise her blood work was remarkable.  Chest x-ray shows a left-sided pleural effusion.  She was given a one-time dose of Lasix 80 mg IV.  Echocardiogram has been completed which shows severe aortic stenosis.  Systolic function normal, grade 2 diastolic dysfunction.    Patient was seen by cardiology, discussed with Dr. Calloway.  Plan for cardiac catheterization tomorrow.  Discussed with patient who expressed understanding and is in agreement with plan.    ROS:  General: no fevers, chills  Respiratory: no cough, dyspnea  Cardiovascular: no chest pain, palpitations  Abdomen: No abdominal pain, nausea, vomiting, or diarrhea  Neurologic: No focal weakness    Objective   Physical Exam:  I have reviewed the vital signs.  Temp:  [96.6 °F (35.9 °C)-98.1 °F (36.7 °C)] 98.1 °F (36.7 °C)  Heart Rate:  [] 82  Resp:  [16-20] 18  BP: (134-174)/() 156/80  General Appearance:    Alert, cooperative, no distress  Head:    Normocephalic, atraumatic  Eyes:    Sclerae anicteric  Neck:   Supple, no mass  Lungs: Clear to auscultation bilaterally, respirations unlabored  Heart: Regular rate and rhythm, S1 and S2 normal, no murmur, rub or gallop  Abdomen:  Soft, non-tender, bowel sounds active, nondistended  Extremities: No clubbing, cyanosis, or edema to lower extremities  Pulses:  2+ and symmetric in distal lower extremities  Skin: No rashes   Neurologic: Oriented x3, Normal strength to extremities    Results Review:    I have reviewed the labs, radiology results and diagnostic studies.    Results from last 7 days   Lab Units 05/01/23  0914   WBC 10*3/mm3 5.24    HEMOGLOBIN g/dL 14.2   HEMATOCRIT % 41.8   PLATELETS 10*3/mm3 281     Results from last 7 days   Lab Units 05/01/23  0914   SODIUM mmol/L 144   POTASSIUM mmol/L 4.4   CHLORIDE mmol/L 106   CO2 mmol/L 26.3   BUN mg/dL 9   CREATININE mg/dL 0.66   CALCIUM mg/dL 9.4   BILIRUBIN mg/dL 0.4   ALK PHOS U/L 103   ALT (SGPT) U/L 17   AST (SGOT) U/L 24   GLUCOSE mg/dL 89     Imaging Results (Last 24 Hours)     Procedure Component Value Units Date/Time    XR Chest 1 View [[id number]] Collected: 05/01/23 0934     Updated: 05/01/23 0938    Narrative:      XR CHEST 1 VW-clinical: Dyspnea on exertion     FINDINGS: Small moderate size left pleural effusion. Cardiac size upper  limits of normal to mildly enlarged. No gross pulmonary edema. No acute  airspace disease identified.     CONCLUSION: Left-sided pleural effusion.     This report was finalized on 5/1/2023 9:35 AM by Dr. Sea Castro M.D.             I have reviewed the medications.  ---------------------------------------------------------------------------------------------  Assessment & Plan   Assessment/Problem List    Dyspnea on exertion      Plan:  Exertional dyspnea  -High-sensitivity troponin 23, trend  -Consult to cardiology, Dr. Calloway  -Echocardiogram pending  -Strict I&O  -Daily weights  -Consult to physical therapy  -NPO after midnight  -IV diuresis today  -Heart catheterization tomorrow 5/3/2023     Hypertension/hypothyroidism  -TSH low at 0.235, levothyroxine dose decreased to 25 mcg daily  -Vitals every 4 hours per nursing    Disposition: Heart catheterization tomorrow    This note will serve as progress note     32 minutes has been spent by [facility] providers in the care of this patient while under observation status      Debra Mayers, APRN 05/02/23 05:28 EDT

## 2023-05-02 NOTE — PLAN OF CARE
Goal Outcome Evaluation:  Plan of Care Reviewed With: patient        Progress: no change  Outcome Evaluation: A/O, no complaints of pain, up in room, NPO since MN, cardiology consult pending, will need 02 while ambulating this morning, VSS, will continue to monitor.

## 2023-05-02 NOTE — CONSULTS
Patient Name: Sigrid Hobson  :1940  82 y.o.    Date of Admission: 2023  Date of Consultation:  23  Encounter Provider: Denita Calloway MD  Place of Service: [facility] CARDIOLOGY  Referring Provider: Ronni Coombs II,*  Patient Care Team:  Aydin Willams MD as PCP - General (Family Medicine)      Chief complaint: aortic stenosis    History of Present Illness:  This is a very anand 82-year-old woman with hypertension and hypothyroidism, alopecia.  She presents for evaluation after exertional dyspnea worsening over the last several months but particularly over the last few weeks it has worsened.  Usually she walks to [employer] and around her neighborhood but this has gotten worse over the last few weeks.  Her family encouraged her to come to the emergency room.  She was found to have a mildly elevated troponin, plateaued in the 20s.  Her BNP is greater than 4000.  Her chest x-ray showed a left pleural effusion.  Her EKG shows normal sinus rhythm with LVH.    This morning she had an echocardiogram showing a preserved systolic ejection fraction, grade 2 diastolic dysfunction, severe aortic stenosis with a valve area of 0.4, mean pressure gradient 44 and velocity of 4.0 m/s.    She denies angina or syncope.  Denies palpitations.  Her only complaint is exertional dyspnea.  She has no lower extremity edema or abdominal bloating.  She has told she has a murmur in the past but has not had any cardiac evaluation    History reviewed. No pertinent past medical history.    History reviewed. No pertinent surgical history.      Prior to Admission medications    Medication Sig Start Date End Date Taking? Authorizing Provider   FLUoxetine (PROzac) 20 MG capsule TAKE 1 CAPSULE BY MOUTH ONCE DAILY 19  Yes Daina Mancuso MD   gabapentin (NEURONTIN) 300 MG capsule  23  Yes ProviderMerari MD   levothyroxine (SYNTHROID, LEVOTHROID) 25 MCG tablet Take 2 tablets by  mouth Daily. 3/17/23 6/15/23 Yes Provider, MD Merari   liothyronine (CYTOMEL) 5 MCG tablet TAKE 1 TABLET(5 MCG) BY MOUTH 1 TIME EACH DAY 2/27/23  Yes Provider, MD Merari   valACYclovir (VALTREX) 500 MG tablet Take 1 tablet by mouth Daily. 1/12/23  Yes Provider, MD Merari       Allergies   Allergen Reactions   • Milk-Related Compounds Diarrhea     SEVERE DIARRHEA       Social History     Socioeconomic History   • Marital status:    Tobacco Use   • Smoking status: Never     Passive exposure: Never   • Smokeless tobacco: Never   Vaping Use   • Vaping Use: Never used   Substance and Sexual Activity   • Alcohol use: Never   • Drug use: Never   • Sexual activity: Not Currently       History reviewed. No pertinent family history.    REVIEW OF SYSTEMS:   All systems reviewed.  Pertinent positives identified in HPI.  All other systems are negative.      Objective:     Vitals:    05/01/23 2047 05/01/23 2335 05/02/23 0403 05/02/23 0740   BP: 148/80 142/81 156/80 140/73   BP Location: Right arm Right arm Right arm Right arm   Patient Position: Lying Lying Sitting Lying   Pulse: 81 82 82 75   Resp: 16 16 18 16   Temp: 98.1 °F (36.7 °C) 97.5 °F (36.4 °C) 98.1 °F (36.7 °C) 97.6 °F (36.4 °C)   TempSrc: Oral Oral Oral Oral   SpO2: 94% 95% 93% 95%   Weight:   60.2 kg (132 lb 11.2 oz)    Height:         Body mass index is 25.92 kg/m².    General Appearance:    Alert, cooperative, in no acute distress   Head:    Normocephalic, without obvious abnormality, atraumatic   Eyes:            Lids and lashes normal, conjunctivae and sclerae normal, no icterus, no pallor, corneas clear, PERRLA   Ears:    Ears appear intact with no abnormalities noted   Throat:   No oral lesions, no thrush, oral mucosa moist   Neck:   No adenopathy, supple, trachea midline, no thyromegaly, no carotid bruit, no JVD   Back:     No kyphosis present, no scoliosis present, no skin lesions, erythema or scars, no tenderness to percussion or  palpation, range of motion normal   Lungs:     Clear to auscultation, respirations regular, even and unlabored    Heart:    Regular rhythm and normal rate, normal S1 and S2, harsh late peaking systolic murmur radiating to the apex and carotids.  No gallop, no rub, no click   Chest Wall:    No abnormalities observed   Abdomen:     Normal bowel sounds, no masses, no organomegaly, soft, nontender, nondistended, no guarding, no rebound  tenderness   Extremities:   Moves all extremities well, no edema, no cyanosis, no redness   Pulses:   Pulses palpable and equal bilaterally. Normal radial, carotid, femoral, dorsalis pedis and posterior tibial pulses bilaterally. Normal abdominal aorta   Skin:  Psychiatric:   No bleeding, bruising or rash    Alert and oriented x 3, normal mood and affect   Lab Review:     Results from last 7 days   Lab Units 05/02/23  0401 05/01/23  0914   SODIUM mmol/L 143 144   POTASSIUM mmol/L 4.1 4.4   CHLORIDE mmol/L 102 106   CO2 mmol/L 28.0 26.3   BUN mg/dL 12 9   CREATININE mg/dL 0.74 0.66   CALCIUM mg/dL 9.0 9.4   BILIRUBIN mg/dL  --  0.4   ALK PHOS U/L  --  103   ALT (SGPT) U/L  --  17   AST (SGOT) U/L  --  24   GLUCOSE mg/dL 101* 89     Results from last 7 days   Lab Units 05/02/23  0401 05/01/23  1235 05/01/23  0914   HSTROP T ng/L 22* 23* 23*     Results from last 7 days   Lab Units 05/02/23  0401   WBC 10*3/mm3 6.18   HEMOGLOBIN g/dL 14.3   HEMATOCRIT % 43.8   PLATELETS 10*3/mm3 278         Results from last 7 days   Lab Units 05/02/23  0401   MAGNESIUM mg/dL 1.9     Results from last 7 days   Lab Units 05/01/23  0914   CHOLESTEROL mg/dL 157   TRIGLYCERIDES mg/dL 52   HDL CHOL mg/dL 50   LDL CHOL mg/dL 96               I personally viewed and interpreted the patient's EKG/Telemetry data.        Assessment and Plan:       1.  Severe aortic stenosis: Symptomatic, exertional dyspnea.  Plan cardiac catheterization tomorrow.  2.  Acute CHF exacerbation, new, due to valvular disease.  Systolic  function is normal.  Grade 2 diastolic dysfunction/HFpEF.  NYHA class II-III symptoms.  Continue IV diuresis today.    Denita Calloway MD  05/02/23  10:10 EDT

## 2023-05-02 NOTE — SIGNIFICANT NOTE
05/02/23 1054   OTHER   Discipline occupational therapist   Rehab Time/Intention   Session Not Performed other (see comments)  (Pt reports she has been ambulating to the bathroom with no issues with ADLs. She reports she is at her baseline and has no OT needs at this time. Please reconsult if status changes. OT will sign off.)

## 2023-05-02 NOTE — PLAN OF CARE
Problem: Adult Inpatient Plan of Care  Goal: Plan of Care Review  Outcome: Ongoing, Progressing  Flowsheets (Taken 5/2/2023 1815)  Progress: improving  Plan of Care Reviewed With:   patient   family  Goal: Patient-Specific Goal (Individualized)  Outcome: Ongoing, Progressing  Goal: Absence of Hospital-Acquired Illness or Injury  Outcome: Ongoing, Progressing  Intervention: Identify and Manage Fall Risk  Recent Flowsheet Documentation  Taken 5/2/2023 1620 by Brianne Pineda RN  Safety Promotion/Fall Prevention:   safety round/check completed   room organization consistent  Taken 5/2/2023 1400 by Brianne Pineda RN  Safety Promotion/Fall Prevention:   room organization consistent   safety round/check completed  Taken 5/2/2023 1200 by Brianne Pineda RN  Safety Promotion/Fall Prevention:   safety round/check completed   room organization consistent  Taken 5/2/2023 1000 by Brianne Pineda RN  Safety Promotion/Fall Prevention:   safety round/check completed   room organization consistent  Taken 5/2/2023 0800 by Brianne Pineda RN  Safety Promotion/Fall Prevention:   safety round/check completed   room organization consistent  Intervention: Prevent Skin Injury  Recent Flowsheet Documentation  Taken 5/2/2023 1400 by Brianne Pineda RN  Body Position: position changed independently  Skin Protection: adhesive use limited  Taken 5/2/2023 0800 by Brianne Pineda RN  Body Position: position changed independently  Skin Protection: adhesive use limited  Intervention: Prevent and Manage VTE (Venous Thromboembolism) Risk  Recent Flowsheet Documentation  Taken 5/2/2023 1400 by Brianne Pineda RN  Activity Management: activity encouraged  VTE Prevention/Management: bilateral  Taken 5/2/2023 0800 by Brianne Pineda RN  Activity Management: activity encouraged  VTE Prevention/Management: bilateral  Intervention: Prevent Infection  Recent Flowsheet Documentation  Taken 5/2/2023 1620 by Brianne Pineda RN  Infection  Prevention:   single patient room provided   rest/sleep promoted  Taken 5/2/2023 1400 by Brianne Pineda RN  Infection Prevention:   rest/sleep promoted   single patient room provided  Taken 5/2/2023 1200 by Brianne Pineda RN  Infection Prevention:   rest/sleep promoted   single patient room provided  Taken 5/2/2023 0800 by Brianne Pineda RN  Infection Prevention:   rest/sleep promoted   single patient room provided  Goal: Optimal Comfort and Wellbeing  Outcome: Ongoing, Progressing  Intervention: Provide Person-Centered Care  Recent Flowsheet Documentation  Taken 5/2/2023 1400 by Brianne Pineda RN  Trust Relationship/Rapport: care explained  Taken 5/2/2023 0800 by Brianne Pineda RN  Trust Relationship/Rapport: care explained  Goal: Readiness for Transition of Care  Outcome: Ongoing, Progressing     Problem: Gas Exchange Impaired  Goal: Optimal Gas Exchange  Outcome: Ongoing, Progressing  Intervention: Optimize Oxygenation and Ventilation  Recent Flowsheet Documentation  Taken 5/2/2023 1400 by Brianne Pineda RN  Head of Bed (HOB) Positioning: HOB at 20-30 degrees  Taken 5/2/2023 0800 by Brianne Pineda RN  Head of Bed (HOB) Positioning: HOB at 20 degrees   Goal Outcome Evaluation:  Plan of Care Reviewed With: patient, family   Pt has been chest pain free today. Pt will have cath in am. Pt up ad tulio, VSS, RSR on the monitor. Pt strict I&O due to diureses. Pt has potty hat in room. Family has been at bedside today. Pt to be NPO after midnight. Pt to have cath around 0930 tomorrow. Will continue to monitor.      Progress: improving

## 2023-05-03 ENCOUNTER — DOCUMENTATION (OUTPATIENT)
Dept: CARDIOLOGY | Facility: HOSPITAL | Age: 83
End: 2023-05-03
Payer: MEDICARE

## 2023-05-03 VITALS
TEMPERATURE: 97.7 F | OXYGEN SATURATION: 95 % | DIASTOLIC BLOOD PRESSURE: 72 MMHG | HEART RATE: 78 BPM | WEIGHT: 131.1 LBS | RESPIRATION RATE: 18 BRPM | HEIGHT: 60 IN | SYSTOLIC BLOOD PRESSURE: 100 MMHG | BODY MASS INDEX: 25.74 KG/M2

## 2023-05-03 LAB
ANION GAP SERPL CALCULATED.3IONS-SCNC: 11.8 MMOL/L (ref 5–15)
BASOPHILS # BLD AUTO: 0.03 10*3/MM3 (ref 0–0.2)
BASOPHILS NFR BLD AUTO: 0.6 % (ref 0–1.5)
BUN SERPL-MCNC: 30 MG/DL (ref 8–23)
BUN/CREAT SERPL: 34.9 (ref 7–25)
CALCIUM SPEC-SCNC: 9.5 MG/DL (ref 8.6–10.5)
CHLORIDE SERPL-SCNC: 101 MMOL/L (ref 98–107)
CO2 SERPL-SCNC: 29.2 MMOL/L (ref 22–29)
CREAT SERPL-MCNC: 0.86 MG/DL (ref 0.57–1)
DEPRECATED RDW RBC AUTO: 47.7 FL (ref 37–54)
EGFRCR SERPLBLD CKD-EPI 2021: 66.7 ML/MIN/1.73
EOSINOPHIL # BLD AUTO: 0.24 10*3/MM3 (ref 0–0.4)
EOSINOPHIL NFR BLD AUTO: 4.8 % (ref 0.3–6.2)
ERYTHROCYTE [DISTWIDTH] IN BLOOD BY AUTOMATED COUNT: 13.7 % (ref 12.3–15.4)
GLUCOSE SERPL-MCNC: 97 MG/DL (ref 65–99)
HCT VFR BLD AUTO: 43.7 % (ref 34–46.6)
HGB BLD-MCNC: 14.8 G/DL (ref 12–15.9)
IMM GRANULOCYTES # BLD AUTO: 0.01 10*3/MM3 (ref 0–0.05)
IMM GRANULOCYTES NFR BLD AUTO: 0.2 % (ref 0–0.5)
LYMPHOCYTES # BLD AUTO: 1.45 10*3/MM3 (ref 0.7–3.1)
LYMPHOCYTES NFR BLD AUTO: 29.1 % (ref 19.6–45.3)
MCH RBC QN AUTO: 32 PG (ref 26.6–33)
MCHC RBC AUTO-ENTMCNC: 33.9 G/DL (ref 31.5–35.7)
MCV RBC AUTO: 94.6 FL (ref 79–97)
MONOCYTES # BLD AUTO: 0.61 10*3/MM3 (ref 0.1–0.9)
MONOCYTES NFR BLD AUTO: 12.2 % (ref 5–12)
NEUTROPHILS NFR BLD AUTO: 2.64 10*3/MM3 (ref 1.7–7)
NEUTROPHILS NFR BLD AUTO: 53.1 % (ref 42.7–76)
NRBC BLD AUTO-RTO: 0 /100 WBC (ref 0–0.2)
PLATELET # BLD AUTO: 264 10*3/MM3 (ref 140–450)
PMV BLD AUTO: 9.3 FL (ref 6–12)
POTASSIUM SERPL-SCNC: 4.1 MMOL/L (ref 3.5–5.2)
RBC # BLD AUTO: 4.62 10*6/MM3 (ref 3.77–5.28)
SODIUM SERPL-SCNC: 142 MMOL/L (ref 136–145)
WBC NRBC COR # BLD: 4.98 10*3/MM3 (ref 3.4–10.8)

## 2023-05-03 PROCEDURE — G0378 HOSPITAL OBSERVATION PER HR: HCPCS

## 2023-05-03 PROCEDURE — 99222 1ST HOSP IP/OBS MODERATE 55: CPT | Performed by: INTERNAL MEDICINE

## 2023-05-03 PROCEDURE — 93454 CORONARY ARTERY ANGIO S&I: CPT | Performed by: INTERNAL MEDICINE

## 2023-05-03 PROCEDURE — 25010000002 HEPARIN (PORCINE) PER 1000 UNITS: Performed by: INTERNAL MEDICINE

## 2023-05-03 PROCEDURE — 99152 MOD SED SAME PHYS/QHP 5/>YRS: CPT | Performed by: INTERNAL MEDICINE

## 2023-05-03 PROCEDURE — 25510000001 IOPAMIDOL PER 1 ML: Performed by: INTERNAL MEDICINE

## 2023-05-03 PROCEDURE — C1894 INTRO/SHEATH, NON-LASER: HCPCS | Performed by: INTERNAL MEDICINE

## 2023-05-03 PROCEDURE — C1769 GUIDE WIRE: HCPCS | Performed by: INTERNAL MEDICINE

## 2023-05-03 PROCEDURE — 99024 POSTOP FOLLOW-UP VISIT: CPT | Performed by: INTERNAL MEDICINE

## 2023-05-03 PROCEDURE — 25010000002 MIDAZOLAM PER 1 MG: Performed by: INTERNAL MEDICINE

## 2023-05-03 PROCEDURE — 80048 BASIC METABOLIC PNL TOTAL CA: CPT | Performed by: PHYSICIAN ASSISTANT

## 2023-05-03 PROCEDURE — 85025 COMPLETE CBC W/AUTO DIFF WBC: CPT | Performed by: PHYSICIAN ASSISTANT

## 2023-05-03 PROCEDURE — 25010000002 FENTANYL CITRATE (PF) 50 MCG/ML SOLUTION: Performed by: INTERNAL MEDICINE

## 2023-05-03 RX ORDER — VERAPAMIL HYDROCHLORIDE 2.5 MG/ML
INJECTION, SOLUTION INTRAVENOUS
Status: DISCONTINUED | OUTPATIENT
Start: 2023-05-03 | End: 2023-05-03 | Stop reason: HOSPADM

## 2023-05-03 RX ORDER — ASPIRIN 81 MG/1
81 TABLET ORAL DAILY
Start: 2023-05-03

## 2023-05-03 RX ORDER — HEPARIN SODIUM 1000 [USP'U]/ML
INJECTION, SOLUTION INTRAVENOUS; SUBCUTANEOUS
Status: DISCONTINUED | OUTPATIENT
Start: 2023-05-03 | End: 2023-05-03 | Stop reason: HOSPADM

## 2023-05-03 RX ORDER — LIDOCAINE HYDROCHLORIDE 20 MG/ML
INJECTION, SOLUTION INFILTRATION; PERINEURAL
Status: DISCONTINUED | OUTPATIENT
Start: 2023-05-03 | End: 2023-05-03 | Stop reason: HOSPADM

## 2023-05-03 RX ORDER — MIDAZOLAM HYDROCHLORIDE 1 MG/ML
INJECTION INTRAMUSCULAR; INTRAVENOUS
Status: DISCONTINUED | OUTPATIENT
Start: 2023-05-03 | End: 2023-05-03 | Stop reason: HOSPADM

## 2023-05-03 RX ORDER — FUROSEMIDE 40 MG/1
40 TABLET ORAL DAILY
Qty: 30 TABLET | Refills: 11 | Status: SHIPPED | OUTPATIENT
Start: 2023-05-03

## 2023-05-03 RX ORDER — ACETAMINOPHEN 325 MG/1
650 TABLET ORAL EVERY 4 HOURS PRN
Status: DISCONTINUED | OUTPATIENT
Start: 2023-05-03 | End: 2023-05-03 | Stop reason: HOSPADM

## 2023-05-03 RX ORDER — SODIUM CHLORIDE 9 MG/ML
INJECTION, SOLUTION INTRAVENOUS
Status: COMPLETED | OUTPATIENT
Start: 2023-05-03 | End: 2023-05-03

## 2023-05-03 RX ORDER — FENTANYL CITRATE 50 UG/ML
INJECTION, SOLUTION INTRAMUSCULAR; INTRAVENOUS
Status: DISCONTINUED | OUTPATIENT
Start: 2023-05-03 | End: 2023-05-03 | Stop reason: HOSPADM

## 2023-05-03 RX ADMIN — ASPIRIN 81 MG: 81 TABLET, COATED ORAL at 09:40

## 2023-05-03 RX ADMIN — GABAPENTIN 300 MG: 300 CAPSULE ORAL at 09:39

## 2023-05-03 RX ADMIN — Medication 10 ML: at 09:39

## 2023-05-03 RX ADMIN — FLUOXETINE HYDROCHLORIDE 20 MG: 20 CAPSULE ORAL at 09:40

## 2023-05-03 NOTE — PROGRESS NOTES
MD Attestation Note    I supervised care provided by the midlevel provider.    The REMI and I have discussed this patient's history, physical exam, and treatment plan. I have reviewed the documentation and personally had a face to face interaction with the patient  I affirm the documentation and agree with the treatment and plan.     I provided a substantive portion of the care of the patient.  I personally performed the physical exam in its entirety, and below are my findings.        Subjective  Pt is a 82 y.o. female admitted from Emergency Department for evaluation and treatment of worsening exertional dyspnea.  Patient found to have severe aortic stenosis.  She is pending cardiac catheterization today.    Physical Exam  GENERAL: Alert and in NAD, Vitals reviewed  HENT: nares patent  EYES: no scleral icterus  CV: regular rhythm, regular rate-systolic murmur  RESPIRATORY: normal effort, clear to auscultation bilateral  ABDOMEN: soft  MUSCULOSKELETAL: no deformity  NEURO: Strength sensation and coordination are grossly intact.  Speech and mentation are unremarkable  SKIN: warm, dry    Assessment/Plan  I discussed tx and evaluation of this patient with DON Frazier.  Appreciate cardiology consultation.  Continue diuresis, catheterization today.  Patient will ultimately need repair of severe aortic stenosis.

## 2023-05-03 NOTE — NURSING NOTE
I met briefly with Ms Hobson this morning and introduced the structural heart program We discussed the evaluation process I provided our introductory packet including information on shared decision making. She will be scheduled to see one of our cardiologist to begin the evaluation process I have provided our contact information should she have any further questions

## 2023-05-03 NOTE — DISCHARGE SUMMARY
Hospital Discharge    Patient Name: Sigrid Hobson  Age/Sex: 82 y.o. female  : 1940  MRN: [id number]    Encounter Provider: Denita Calloway MD  Referring Provider: Ronni Coombs II,*  Place of Service: [facility]  Patient Care Team:  Aydin Willams MD as PCP - General (Family Medicine)         Date of Discharge:  5/3/2023   Date of Admit: 2023    Discharge Condition: Good  Discharge Diagnosis:    Dyspnea on exertion    Aortic stenosis, severe      Hospital Course:   Sigrid Hobson is a 82 y.o. female who presented with This is a very pleasant lady who presented for evaluation of worsening exertional dyspnea over the last weeks to months.  She was found to have a left pleural effusion on chest x-ray.  She had an echocardiogram which showed preserved systolic ejection fraction calcified aortic and mitral valves.  Mild MR with severe aortic stenosis, mean pressure gradient 40mmHg peak velocity 4 m/s2.   She was diuresed aggressively. Cath shows severe ostial diagonal disease, moderate mid circumflex disease, treated medically for now given lack of angina. Structural heart team will evaluate the patient prior to discharge to begin TAVR workup.     Objective:  Temp:  [97.3 °F (36.3 °C)-98.1 °F (36.7 °C)] 97.7 °F (36.5 °C)  Heart Rate:  [84-92] 89  Resp:  [12-18] 18  BP: (121-156)/(67-76) 156/75    Intake/Output Summary (Last 24 hours) at 5/3/2023 1146  Last data filed at 5/3/2023 1122  Gross per 24 hour   Intake 220 ml   Output 1150 ml   Net -930 ml     Body mass index is 25.6 kg/m².      23  1927 23  0403 23  05   Weight: 62.4 kg (137 lb 9.1 oz) 60.2 kg (132 lb 11.2 oz) 59.5 kg (131 lb 1.6 oz)     Weight change: -4.433 kg (-9 lb 12.4 oz)    Physical Exam:  GEN: Alert, cooperative, no distress  Head: normocephalic, atruamatic  Eyes: Normal conjunctiva and sclera, no icterus, PERRL  Neck: No JVD, normal carotid pulsation without  bruits  Lungs: clear to auscultation bilaterally, normal rate and effort  Heart: RRR, 3/6 harsh late peaking aS murmur Normal s1 and S2.   Abdomen: Soft, nontender, normal bowel sounds  Extremities: No edema or marked deformities. Normal strength.   Skin: No rash, no cyanosis.   Pscyhciatric: Alert and Oriented x 3  Back: No Kypohsis or scoliosis.       Procedures Performed  Procedure(s):  Cardiac Catheterization/Vascular Study  Coronary angiography       Consults:  Consults     Date and Time Order Name Status Description    5/1/2023 10:49 AM Inpatient Cardiology Consult            Pertinent Test Results:  Results from last 7 days   Lab Units 05/03/23  0510 05/02/23  0401 05/01/23  0914   SODIUM mmol/L 142 143 144   POTASSIUM mmol/L 4.1 4.1 4.4   CHLORIDE mmol/L 101 102 106   CO2 mmol/L 29.2* 28.0 26.3   BUN mg/dL 30* 12 9   CREATININE mg/dL 0.86 0.74 0.66   GLUCOSE mg/dL 97 101* 89   CALCIUM mg/dL 9.5 9.0 9.4   AST (SGOT) U/L  --   --  24   ALT (SGPT) U/L  --   --  17     Results from last 7 days   Lab Units 05/02/23  0401 05/01/23  1235 05/01/23  0914   HSTROP T ng/L 22* 23* 23*     Results from last 7 days   Lab Units 05/03/23  0510 05/02/23  0401 05/01/23  0914   WBC 10*3/mm3 4.98 6.18 5.24   HEMOGLOBIN g/dL 14.8 14.3 14.2   HEMATOCRIT % 43.7 43.8 41.8   PLATELETS 10*3/mm3 264 278 281         Results from last 7 days   Lab Units 05/02/23  0401   MAGNESIUM mg/dL 1.9     Results from last 7 days   Lab Units 05/01/23  0914   CHOLESTEROL mg/dL 157   TRIGLYCERIDES mg/dL 52   HDL CHOL mg/dL 50     Results from last 7 days   Lab Units 05/01/23  0914   PROBNP pg/mL 4,483.0*     Results from last 7 days   Lab Units 05/01/23  1235   TSH uIU/mL 0.235*       Discharge Medications     Discharge Medications      New Medications      Instructions Start Date   aspirin 81 MG EC tablet   81 mg, Oral, Daily      furosemide 40 MG tablet  Commonly known as: LASIX   40 mg, Oral, Daily         Continue These Medications       Instructions Start Date   FLUoxetine 20 MG capsule  Commonly known as: PROzac   TAKE 1 CAPSULE BY MOUTH ONCE DAILY      gabapentin 300 MG capsule  Commonly known as: NEURONTIN   No dose, route, or frequency recorded.      levothyroxine 25 MCG tablet  Commonly known as: SYNTHROID, LEVOTHROID   50 mcg, Oral, Daily      liothyronine 5 MCG tablet  Commonly known as: CYTOMEL   TAKE 1 TABLET(5 MCG) BY MOUTH 1 TIME EACH DAY      valACYclovir 500 MG tablet  Commonly known as: VALTREX   500 mg, Oral, Daily             Discharge Diet:    Dietary Orders (From admission, onward)     Start     Ordered    05/04/23 0001  NPO Diet NPO Type: Strict NPO  Diet Effective Midnight        Question:  NPO Type  Answer:  Strict NPO    05/03/23 0241    05/03/23 1146  Diet: Regular/House Diet; Texture: Regular Texture (IDDSI 7); Fluid Consistency: Thin (IDDSI 0)  Diet Effective Now        References:    Diet Order [place]   Question Answer Comment   Diets: Regular/House Diet    Texture: Regular Texture (IDDSI 7)    Fluid Consistency: Thin (IDDSI 0)        05/03/23 1145                Activity at Discharge:   as tolerated    Discharge disposition: home     Discharge Instructions and Follow ups:  Future Appointments   Date Time Provider Department Center   5/10/2023 10:40 AM José Kenny MD [facility]      Follow-up Information     Aydin Willams MD .    Specialty: Family Medicine  Contact information:  [address] [place] [address]  [phone]                         Test Results Pending at Discharge:      Denita Calloway MD  05/03/23  11:46 EDT    Time: Discharge 45 min    EMR Dragon/Transcription disclaimer:   Part of this note may be an electronic transcription/translation of spoken language to printed text using the Dragon dictation system.

## 2023-05-03 NOTE — NURSING NOTE
Blaise with cath lab picked pt up. All belongings sent with family. Care transferred to cath lab (Dr Calloway) at this time.

## 2023-05-03 NOTE — PLAN OF CARE
Goal Outcome Evaluation:   Patient remains in observation for dyspnea on exertion.   Outcome summary: Patient stable, AO x 4, up without assistance. Patient remains on strict I&O. Patient scheduled for cath lab at 0930 this AM. Cardiology to see today.

## 2023-05-03 NOTE — PROGRESS NOTES
ED OBSERVATION PROGRESS/DISCHARGE SUMMARY    Date of Admission: 5/1/2023   LOS: 0 days   PCP: Aydin Willams MD    Subjective   No acute events overnight.    Hospital Outcome:   82-year-old female admitted to the observation unit with a complaint of dyspnea with exertion over the past 2 weeks.  High sensitivity serial troponins were 23, 23.  BNP was 4483.  Otherwise her blood work was remarkable.  Chest x-ray shows a left-sided pleural effusion.  She was given a one-time dose of Lasix 80 mg IV.  Echocardiogram has been completed which shows severe aortic stenosis.  Systolic function normal, grade 2 diastolic dysfunction.     Patient was seen by cardiology, discussed with Dr. Calloway.  Plan for cardiac catheterization today 5/3/2023.  Discussed with patient who expressed understanding and is in agreement with plan.     ROS:  General: no fevers, chills  Respiratory: no cough, dyspnea  Cardiovascular: no chest pain, palpitations  Abdomen: No abdominal pain, nausea, vomiting, or diarrhea  Neurologic: No focal weakness    Objective   Physical Exam:  I have reviewed the vital signs.  Temp:  [97.3 °F (36.3 °C)-98.2 °F (36.8 °C)] 97.3 °F (36.3 °C)  Heart Rate:  [75-92] 84  Resp:  [16] 16  BP: (121-155)/(67-76) 155/76  General Appearance:    Alert, cooperative, no distress  Head:    Normocephalic, atraumatic  Eyes:    Sclerae anicteric  Neck:   Supple, no mass  Lungs: Clear to auscultation bilaterally, respirations unlabored  Heart: Regular rate and rhythm, S1 and S2 normal, no murmur, rub or gallop  Abdomen:  Soft, non-tender, bowel sounds active, nondistended  Extremities: No clubbing, cyanosis, or edema to lower extremities  Pulses:  2+ and symmetric in distal lower extremities  Skin: No rashes   Neurologic: Oriented x3, Normal strength to extremities    Results Review:    I have reviewed the labs, radiology results and diagnostic studies.    Results from last 7 days   Lab Units 05/02/23  0401   WBC 10*3/mm3 6.18    HEMOGLOBIN g/dL 14.3   HEMATOCRIT % 43.8   PLATELETS 10*3/mm3 278     Results from last 7 days   Lab Units 05/02/23  0401 05/01/23  0914   SODIUM mmol/L 143 144   POTASSIUM mmol/L 4.1 4.4   CHLORIDE mmol/L 102 106   CO2 mmol/L 28.0 26.3   BUN mg/dL 12 9   CREATININE mg/dL 0.74 0.66   CALCIUM mg/dL 9.0 9.4   BILIRUBIN mg/dL  --  0.4   ALK PHOS U/L  --  103   ALT (SGPT) U/L  --  17   AST (SGOT) U/L  --  24   GLUCOSE mg/dL 101* 89     Imaging Results (Last 24 Hours)     ** No results found for the last 24 hours. **          I have reviewed the medications.  ---------------------------------------------------------------------------------------------  Assessment & Plan   Assessment/Problem List    Dyspnea on exertion    Aortic stenosis, severe      Plan:  Exertional dyspnea  -High-sensitivity troponin 23, trend  -Consult to cardiology, Dr. Calloway  -Echocardiogram pending  -Strict I&O  -Daily weights  -Consult to physical therapy  -NPO after midnight  -IV diuresis   -Heart catheterization today 5/3/2023     Hypertension/hypothyroidism  -TSH low at 0.235, levothyroxine dose decreased to 25 mcg daily  -Vitals every 4 hours per nursing       Disposition: To Cath Lab with cardiology    This note will serve as transfer summary    31 minutes has been spent by [facility] providers in the care of this patient while under observation status    Debra Mayers, APRN 05/03/23 04:48 EDT    I have worn appropriate PPE during this patient encounter, sanitized my hands both with entering and exiting patient's room.

## 2023-05-03 NOTE — DISCHARGE INSTRUCTIONS
[facility]  [address] [place] [address]    Coronary Angiogram (Radial/Ulnar Approach) After Care    Refer to this sheet in the next few weeks. These instructions provide you with information on caring for yourself after your procedure. Your caregiver may also give you more specific instructions. Your treatment has been planned according to current medical practices, but problems sometimes occur. Call your caregiver if you have any problems or questions after your procedure.    Home Care Instructions:  You may shower the day after the procedure. Remove the bandage (dressing) and gently wash the site with plain soap and water. Gently pat the site dry. You may apply a band aid daily for 2 days if desired.    Do not apply powder or lotion to the site.  Do not submerge the affected site in water for 3 to 5 days or until the site is completely healed.   Do not lift, push or pull anything over 5 pounds for 5 days after your procedure or as directed by your physician.  As a reference, a gallon of milk weighs 8 pounds.   Inspect the site at least twice daily. You may notice some bruising at the site and it may be tender for 1 to 2 weeks.     Increase your fluid intake for the next 2 days.    Keep arm elevated for 24 hours. For the remainder of the day, keep your arm in “Pledge of Allegiance” position when up and about.     You may drive 24 hours after the procedure unless otherwise instructed by your caregiver.  Do not operate machinery or power tools for 24 hours.  A responsible adult should be with you for the first 24 hours after you arrive home. Do not make any important legal decisions or sign legal papers for 24 hours.  Do not drink alcohol for 24 hours.    Metformin or any medications containing Metformin should not be taken for 48 hours after your procedure.      Call Your Doctor if:   You have unusual pain at the radial/ulnar (wrist) site.  You have redness, warmth, swelling, or pain at the  radial/ulnar (wrist) site.  You have drainage (other than a small amount of blood on the dressing).  `You have chills or a fever > 101.  Your arm becomes pale or dark, cool, tingly, or numb.  You develop chest pain, shortness of breath, feel faint or pass out.    You have heavy bleeding from the site, hold pressure on the site for 20 minutes.  If the bleeding stops, apply a fresh bandage and call your cardiologist.  However, if you        continue to have bleeding, call 911 and continue to apply pressure to the site.   You have any symptoms of a stroke.  Remember BE FAST  B-balance. Sudden trouble walking or loss of balance.  E-eyes.  Sudden changes in how you see or a sudden onset of a very bad headache.   F-face. Sudden weakness or loss of feeling of the face or facial droop on one side.   A-arms Sudden weakness or numbness in one arm.  One arm drifts down if they are both held out in front of you. This happens suddenly and usually on one side of the body.   S-speech.  Sudden trouble speaking, slurred speech or trouble understanding what are saying.   T-time  Time to call emergency services.  Write down the symptoms and the time they started.

## 2023-05-04 ENCOUNTER — TELEPHONE (OUTPATIENT)
Dept: CARDIOLOGY | Facility: HOSPITAL | Age: 83
End: 2023-05-04
Payer: MEDICARE

## 2023-05-04 DIAGNOSIS — I35.0 AORTIC STENOSIS, SEVERE: Primary | ICD-10-CM

## 2023-05-04 NOTE — TELEPHONE ENCOUNTER
Ms Hobson has been scheduled to see Dr Dawn 5/10/23 at 12:30 We are working on having a CTA scheduled same day The hospital schedulers  will reach out to her son Robert to set this up

## 2023-05-04 NOTE — TELEPHONE ENCOUNTER
I spoke with patients son Robert and he is agreeable to scheduling his mother's CTA 9am 5/9/23  followed by an office visit with Dr Cohen same day at 11am I have provided our contact information should they have any further questions

## 2023-05-09 ENCOUNTER — OFFICE VISIT (OUTPATIENT)
Dept: CARDIAC SURGERY | Facility: CLINIC | Age: 83
End: 2023-05-09
Payer: MEDICARE

## 2023-05-09 ENCOUNTER — HOSPITAL ENCOUNTER (OUTPATIENT)
Dept: CT IMAGING | Facility: HOSPITAL | Age: 83
Discharge: HOME | End: 2023-05-09
Admitting: INTERNAL MEDICINE
Payer: MEDICARE

## 2023-05-09 VITALS — HEART RATE: 70 BPM

## 2023-05-09 VITALS
BODY MASS INDEX: 25.52 KG/M2 | SYSTOLIC BLOOD PRESSURE: 123 MMHG | TEMPERATURE: 97.1 F | OXYGEN SATURATION: 97 % | HEART RATE: 75 BPM | HEIGHT: 60 IN | RESPIRATION RATE: 18 BRPM | WEIGHT: 130 LBS | DIASTOLIC BLOOD PRESSURE: 73 MMHG

## 2023-05-09 DIAGNOSIS — I38 DIASTOLIC CHF DUE TO VALVULAR DISEASE: ICD-10-CM

## 2023-05-09 DIAGNOSIS — I50.30 DIASTOLIC CHF DUE TO VALVULAR DISEASE: ICD-10-CM

## 2023-05-09 DIAGNOSIS — I35.0 AORTIC STENOSIS, SEVERE: ICD-10-CM

## 2023-05-09 DIAGNOSIS — R06.09 DYSPNEA ON EXERTION: Primary | ICD-10-CM

## 2023-05-09 DIAGNOSIS — I35.0 AORTIC STENOSIS, SEVERE: Primary | ICD-10-CM

## 2023-05-09 LAB
ANION GAP SERPL CALCULATED.3IONS-SCNC: 11.1 MMOL/L (ref 5–15)
BUN SERPL-MCNC: 18 MG/DL (ref 8–23)
BUN/CREAT SERPL: 23.7 (ref 7–25)
CALCIUM SPEC-SCNC: 9.7 MG/DL (ref 8.6–10.5)
CHLORIDE SERPL-SCNC: 105 MMOL/L (ref 98–107)
CO2 SERPL-SCNC: 27.9 MMOL/L (ref 22–29)
CREAT BLDA-MCNC: 1 MG/DL (ref 0.6–1.3)
CREAT SERPL-MCNC: 0.76 MG/DL (ref 0.57–1)
EGFRCR SERPLBLD CKD-EPI 2021: 77.4 ML/MIN/1.73
GLUCOSE SERPL-MCNC: 92 MG/DL (ref 65–99)
POTASSIUM SERPL-SCNC: 3.9 MMOL/L (ref 3.5–5.2)
SODIUM SERPL-SCNC: 144 MMOL/L (ref 136–145)

## 2023-05-09 PROCEDURE — 74174 CTA ABD&PLVS W/CONTRAST: CPT

## 2023-05-09 PROCEDURE — 71275 CT ANGIOGRAPHY CHEST: CPT

## 2023-05-09 PROCEDURE — 25510000001 IOPAMIDOL PER 1 ML: Performed by: INTERNAL MEDICINE

## 2023-05-09 PROCEDURE — 80048 BASIC METABOLIC PNL TOTAL CA: CPT | Performed by: THORACIC SURGERY (CARDIOTHORACIC VASCULAR SURGERY)

## 2023-05-09 PROCEDURE — 82565 ASSAY OF CREATININE: CPT

## 2023-05-09 RX ORDER — FLUTICASONE PROPIONATE 50 MCG
SPRAY, SUSPENSION (ML) NASAL AS NEEDED
COMMUNITY
Start: 2023-03-11

## 2023-05-09 RX ORDER — DIPHENOXYLATE HYDROCHLORIDE AND ATROPINE SULFATE 2.5; .025 MG/1; MG/1
TABLET ORAL DAILY
COMMUNITY

## 2023-05-09 RX ADMIN — IOPAMIDOL 95 ML: 755 INJECTION, SOLUTION INTRAVENOUS at 09:38

## 2023-05-09 NOTE — LETTER
May 15, 2023     Aydin Willams MD  [address]  [facility] [address]    Patient: Sigrid Hobson   YOB: 1940   Date of Visit: 5/9/2023       Dear Dr. Imer MD:    Thank you for referring Sigrid Hobson to me for evaluation. Below are the relevant portions of my assessment and plan of care.    If you have questions, please do not hesitate to call me. I look forward to following Sigrid along with you.         Sincerely,        Cole Cohen MD        CC: MD Noel Oliveira Sebastian, MD  05/15/23 1101  Sign when Signing Visit  5/15/2023    Seen on 5/9/2023    Chief Complaint     Dyspnea, evaluation of aortic stenosis    History of Present Illness:       Dear Denita and Colleagues,  It was nice to see Sigrid Hobson in consultation at your request. She is a 82 y.o. female with seizures, hypertension, hypothyroidism, tremors, and a heart murmur who has developed progressive dyspnea exertion and fatigue.  She states that over the last 6 months she has decline and she has trouble climbing stairs. She denies syncope, TIA, orthopnea, PND or lower extremity edema.  She had a 2D echocardiogram that showed an abnormal aortic valve with mild regurgitation and severe stenosis with an aortic valve area 0.46 cm², mean gradient of 44 mmHg and peak velocity of 4 m/s.  The ejection fraction was preserved.  She had a cardiac cath that showed no significant coronary artery disease except for an 80% ostial first diagonal.  She had a TAVR CTA that showed feasibility for iliofemoral disease and implant ability in the aortic valve.  She denies a family history of aneurysms, dissections or connective tissue disorders.  She denies rheumatic or scarlet fever.      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       Past Medical History:   Diagnosis Date   • Awareness under anesthesia     STATES HARD TO PUT  UNDER   • Heart valve disease    • Hypertension    • Hypothyroid    • Seizure 1980    UNKNOWN CAUSE   • Shingles    • Shortness of breath     UPON EXERTION   • Tremors of nervous system        Past Surgical History:   Procedure Laterality Date   • APPENDECTOMY     • CARDIAC CATHETERIZATION Left 05/03/2023    Procedure: Cardiac Catheterization/Vascular Study;  Surgeon: Denita Calloway MD;  Location:  [facility];  Service: Cardiovascular;  Laterality: Left;   • CARDIAC CATHETERIZATION N/A 05/03/2023    Procedure: Coronary angiography;  Surgeon: Denita Calloway MD;  Location:  [facility];  Service: Cardiovascular;  Laterality: N/A;   • HYSTERECTOMY     • TUBAL ABDOMINAL LIGATION         Allergies   Allergen Reactions   • Diazepam Mental Status Change   • Hydrocodone Mental Status Change   • Milk-Related Compounds Diarrhea   • Promethazine Mental Status Change   • Quinine Derivatives Hives         Current Outpatient Medications:   •  aspirin 81 MG EC tablet, Take 1 tablet by mouth Daily. (Patient taking differently: Take 1 tablet by mouth Daily. HOLD PER MD INSTRUCTIONS PRIOR TO SURGERY), Disp: , Rfl:   •  FLUoxetine (PROzac) 20 MG capsule, TAKE 1 CAPSULE BY MOUTH ONCE DAILY, Disp: 90 capsule, Rfl: 0  •  fluticasone (FLONASE) 50 MCG/ACT nasal spray, As Needed for Allergies., Disp: , Rfl:   •  furosemide (LASIX) 40 MG tablet, Take 1 tablet by mouth Daily., Disp: 30 tablet, Rfl: 11  •  gabapentin (NEURONTIN) 300 MG capsule, Take 1 capsule by mouth 3 (Three) Times a Day., Disp: , Rfl:   •  levothyroxine (SYNTHROID, LEVOTHROID) 25 MCG tablet, Take 2 tablets by mouth Daily., Disp: 60 tablet, Rfl: 2  •  liothyronine (CYTOMEL) 5 MCG tablet, TAKE 1 TABLET(5 MCG) BY MOUTH 1 TIME EACH DAY, Disp: , Rfl:   •  metroNIDAZOLE (METROCREAM) 0.75 % cream, APPLY TO AREAS OF SKIN AFFECTED BY ROSACEA EVERY DAY, Disp: , Rfl:   •  multivitamin (THERAGRAN) tablet tablet, Take  by mouth Daily. HOLD FOR SURGERY,  Disp: , Rfl:   •  valACYclovir (VALTREX) 500 MG tablet, Take 1 tablet by mouth Daily., Disp: , Rfl:   •  Calcium Citrate-Vitamin D (Citrus Calcium/Vitamin D) 200-6.25 MG-MCG tablet, Take  by mouth Daily. HOLD FOR SURGERY, Disp: , Rfl:   •  chlorhexidine (PERIDEX) 0.12 % solution, Apply 15 mL to the mouth or throat 2 (Two) Times a Day. PRIOR TO SURGERY, Disp: , Rfl:   •  folic acid-vit B6-vit B12 (FOLTABS) 0.8-10-0.115 MG tablet tablet, Take  by mouth Daily. HOLD FOR SURGERY, Disp: , Rfl:   •  Linoleic Acid-Sunflower Oil 500-1000 MG capsule, Take  by mouth. HOLD FOR SURGERY, Disp: , Rfl:   •  mupirocin (BACTROBAN) 2 % nasal ointment, into the nostril(s) as directed by provider Take As Directed. PRIOR TO SURGERY, Disp: , Rfl:   •  Potassium 99 MG tablet, Take  by mouth Take As Directed. PRIOR TO SURGERY, Disp: , Rfl:   •  vitamin C (ASCORBIC ACID) 250 MG tablet, Take 1 tablet by mouth Daily. HOLD FOR SURGERY, Disp: , Rfl:   •  vitamin E 100 UNIT capsule, Take 1 capsule by mouth Daily. HOLD FOR SURGERY, Disp: , Rfl:     Social History     Socioeconomic History   • Marital status:    Tobacco Use   • Smoking status: Never     Passive exposure: Never   • Smokeless tobacco: Never   Vaping Use   • Vaping Use: Never used   Substance and Sexual Activity   • Alcohol use: Never   • Drug use: Never   • Sexual activity: Not Currently       Family History   Problem Relation Age of Onset   • Malig Hyperthermia Neg Hx      Review of Systems   Constitutional: Positive for fatigue.   Respiratory: Positive for shortness of breath.    Cardiovascular: Negative for chest pain, palpitations and leg swelling.   Musculoskeletal: Negative for gait problem.   Neurological: Positive for weakness. Negative for dizziness and syncope.   All other systems reviewed and are negative.      Physical Exam:    Vital Signs:  Weight: 59 kg (130 lb)   Body mass index is 25.39 kg/m².  Temp: 97.1 °F (36.2 °C)   Heart Rate: 75   BP: 123/73      Constitutional:       Appearance: Well-developed.   Eyes:      Conjunctiva/sclera: Conjunctivae normal.      Pupils: Pupils are equal, round, and reactive to light.   HENT:      Head: Normocephalic and atraumatic.      Nose: Nose normal.   Neck:      Thyroid: No thyromegaly.      Vascular: No JVD.      Lymphadenopathy: No cervical adenopathy.   Pulmonary:      Effort: Pulmonary effort is normal.      Breath sounds: Normal breath sounds. No rales.   Cardiovascular:      Normal rate. Regular rhythm.      Murmurs: There is a grade 3/6 harsh midsystolic murmur at the URSB, radiating to the neck.      No gallop.   Pulses:     Intact distal pulses. No decreased pulses.   Edema:     Peripheral edema absent.   Abdominal:      General: Bowel sounds are normal. There is no distension.      Palpations: Abdomen is soft. There is no abdominal mass.      Tenderness: There is no abdominal tenderness.   Musculoskeletal: Normal range of motion.         General: No tenderness or deformity.      Cervical back: Normal range of motion and neck supple. Skin:     General: Skin is warm and dry.      Findings: No erythema or rash.   Neurological:      Mental Status: Alert and oriented to person, place, and time.      Deep Tendon Reflexes: Reflexes are normal and symmetric.   Psychiatric:         Behavior: Behavior normal.          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  I recommend TAVR over SAVR due to age, frailty and moderate risk for surgery and difficulty recovering.  I discussed the risks (STS calculated), benefits and (of the procedure and she wishes to proceed.  I discussed the option of open rescue and she verbalized agreement.  Diastolic congestive heart failure with New [place] Heart Association symptoms class III.  Plan for TAVR  Hypertension, well controlled continue present treatment    Thank you for allowing me to participate in her care.    Regards,    Cole Cohen M.D.    Spent over 65 minutes before, during and after the office visit in reviewing the records, examining the patient, reviewing and interpreting myself the echocardiogram, the cardiac cath, the CT scan for TAVR evaluation, spent time in discussing the findings and options with the patient and family, discussing the procedure in detail, discussing my recommendation of TAVR over SAVR and spent time in documenting in the electronic record

## 2023-05-09 NOTE — CONSULTS
Referring Provider:       Patient Care Team:  Aydin Willams MD as PCP - General (Family Medicine)      Reason for Consultation:   Severe degenerative aortic stenosis  Essential hypertension      History of present illness: Very pleasant 82-year-old with a medical history of essential hypertension, hypothyroidism, alopecia, and exertional dyspnea who presented to the ER for this complaints.  She was noted to have a mildly elevated high-sensitivity troponin and proBNP along with a left pleural effusion.  She underwent transthoracic echocardiogram showed a mean gradient of 44 mmHg across the aortic valve and a peak velocity of 4 m/s.  She denies any angina or syncope.  She underwent coronary angiography with just a mild 30% proximal stenosis.  There was also ostial diagonal stenosis.  Patient is currently doing well post procedure.    Review of Systems  All other systems reviewed and negative.     Past Medical History:   Past Medical History:   Diagnosis Date   • Heart valve disease    • Hypertension    • Shingles        Past Surgical History:   Past Surgical History:   Procedure Laterality Date   • CARDIAC CATHETERIZATION Left 5/3/2023    Procedure: Cardiac Catheterization/Vascular Study;  Surgeon: Denita Calloway MD;  Location: [facility] CATH INVASIVE LOCATION;  Service: Cardiovascular;  Laterality: Left;   • CARDIAC CATHETERIZATION N/A 5/3/2023    Procedure: Coronary angiography;  Surgeon: Denita Calloway MD;  Location:  [facility] CATH INVASIVE LOCATION;  Service: Cardiovascular;  Laterality: N/A;       Family History: History reviewed. No pertinent family history.    Social History:   Social History     Tobacco Use   • Smoking status: Never     Passive exposure: Never   • Smokeless tobacco: Never   Vaping Use   • Vaping Use: Never used   Substance Use Topics   • Alcohol use: Never   • Drug use: Never       Home Medications:   No medications prior to admission.       Current Medications: No current  "facility-administered medications for this encounter.    Current Outpatient Medications:   •  FLUoxetine (PROzac) 20 MG capsule, TAKE 1 CAPSULE BY MOUTH ONCE DAILY, Disp: 90 capsule, Rfl: 0  •  gabapentin (NEURONTIN) 300 MG capsule, , Disp: , Rfl:   •  levothyroxine (SYNTHROID, LEVOTHROID) 25 MCG tablet, Take 2 tablets by mouth Daily., Disp: 60 tablet, Rfl: 2  •  liothyronine (CYTOMEL) 5 MCG tablet, TAKE 1 TABLET(5 MCG) BY MOUTH 1 TIME EACH DAY, Disp: , Rfl:   •  valACYclovir (VALTREX) 500 MG tablet, Take 1 tablet by mouth Daily., Disp: , Rfl:   •  aspirin 81 MG EC tablet, Take 1 tablet by mouth Daily., Disp: , Rfl:   •  fluticasone (FLONASE) 50 MCG/ACT nasal spray, SHAKE LIQUID AND USE 1 SPRAY IN EACH NOSTRIL EVERY DAY, Disp: , Rfl:   •  furosemide (LASIX) 40 MG tablet, Take 1 tablet by mouth Daily., Disp: 30 tablet, Rfl: 11  •  metroNIDAZOLE (METROCREAM) 0.75 % cream, APPLY TO AREAS OF SKIN AFFECTED BY ROSACEA EVERY DAY, Disp: , Rfl:   •  multivitamin (THERAGRAN) tablet tablet, Take  by mouth., Disp: , Rfl:      Allergies: Milk-related compounds      Vital Signs      Flowsheet Rows    Flowsheet Row First Filed Value   Admission Height 152.4 cm (60\") Documented at 05/01/2023 0904   Admission Weight 63.9 kg (140 lb 14 oz) Documented at 05/01/2023 0901          General Appearance:    Alert, cooperative, in no acute distress.  Alopecia.   Head:    Normocephalic, without obvious abnormality, atraumatic       Neck:   No adenopathy, supple, no thyromegaly, no carotid bruit, no    JVD   Lungs:     Clear to auscultation bilaterally, no wheezes, rales, or     rhonchi    Heart:    Normal rate, regular rhythm, 3 out of 6 systolic murmur peak late, no rub, no gallop   Chest Wall:    No abnormalities observed   Abdomen:     Normal bowel sounds, soft, nontender, nondistended,            no rebound tenderness   Extremities:   No cyanosis, clubbing, or edema   Pulses:   Pulses palpable and equal bilaterally   Skin:   No bleeding " or rash   Lymph nodes:   No cervical adenopathy   Neurologic:   Cranial nerves 2 - 12 grossly intact, sensation intact               Results Review: I personally viewed and interpreted the patient's EKG/Telemetry data    Results from last 7 days   Lab Units 05/03/23  0510   WBC 10*3/mm3 4.98   HEMOGLOBIN g/dL 14.8   HEMATOCRIT % 43.7   PLATELETS 10*3/mm3 264     Results from last 7 days   Lab Units 05/09/23  0926   SODIUM mmol/L 144   POTASSIUM mmol/L 3.9   CHLORIDE mmol/L 105   CO2 mmol/L 27.9   BUN mg/dL 18   CREATININE mg/dL 0.76   GLUCOSE mg/dL 92   CALCIUM mg/dL 9.7     Lab Results   Lab Value Date/Time    TROPONINT 22 (H) 05/02/2023 0401    TROPONINT 23 (H) 05/01/2023 1235    TROPONINT 23 (H) 05/01/2023 0914           Assessment & Plan   1.  Severe degenerative aortic valve stenosis: Symptomatic.  2.  Nonobstructive coronary artery disease  3.  Chronic heart failure with preserved ejection fraction  4.  Frailty    -Patient is currently doing well post procedure.  I reviewed the transthoracic echocardiogram report and images along with the catheterization report and images.  The patient clearly has severe degenerative aortic valve stenosis that is symptomatic resulting in near Heart Association class II symptoms of dyspnea on exertion.  - I have recommended moving forward with transcatheter aortic valve replacement CTA and surgical evaluation.  Patient is aware.    I discussed the patient's findings and my recommendations with the patient

## 2023-05-10 ENCOUNTER — PREP FOR SURGERY (OUTPATIENT)
Dept: OTHER | Facility: HOSPITAL | Age: 83
End: 2023-05-10
Payer: MEDICARE

## 2023-05-10 ENCOUNTER — TELEPHONE (OUTPATIENT)
Dept: CARDIOLOGY | Facility: HOSPITAL | Age: 83
End: 2023-05-10
Payer: MEDICARE

## 2023-05-10 DIAGNOSIS — I38 DIASTOLIC CHF DUE TO VALVULAR DISEASE: ICD-10-CM

## 2023-05-10 DIAGNOSIS — I35.0 AORTIC STENOSIS, SEVERE: Primary | ICD-10-CM

## 2023-05-10 DIAGNOSIS — R79.1 ABNORMAL COAGULATION PROFILE: ICD-10-CM

## 2023-05-10 DIAGNOSIS — R79.9 ABNORMAL FINDING OF BLOOD CHEMISTRY, UNSPECIFIED: ICD-10-CM

## 2023-05-10 DIAGNOSIS — I50.30 DIASTOLIC CHF DUE TO VALVULAR DISEASE: ICD-10-CM

## 2023-05-10 DIAGNOSIS — I50.32 CHRONIC DIASTOLIC (CONGESTIVE) HEART FAILURE: ICD-10-CM

## 2023-05-10 RX ORDER — CHLORHEXIDINE GLUCONATE 0.12 MG/ML
15 RINSE ORAL EVERY 12 HOURS
Status: CANCELLED | OUTPATIENT
Start: 2023-05-10 | End: 2023-05-11

## 2023-05-10 RX ORDER — CEFAZOLIN SODIUM 2 G/100ML
2 INJECTION, SOLUTION INTRAVENOUS ONCE
OUTPATIENT
Start: 2023-05-10 | End: 2023-05-10

## 2023-05-10 RX ORDER — CHLORHEXIDINE GLUCONATE 0.12 MG/ML
15 RINSE ORAL ONCE
OUTPATIENT
Start: 2023-05-10 | End: 2023-05-10

## 2023-05-10 NOTE — TELEPHONE ENCOUNTER
I spoke with pt's son (Robert) RE: scheduling TAVR.   She has been scheduled for Tue 5/16/23, they are agreeable. PAT is scheduled for Fri 5/12 at 0700. His brother & sister in law (Dayo & Briseyda Hobson) will bring Ms Hobson to the PAT visit. Dates/times have been provided, detailed instructions will be reviewed on Fri during PAT.  Robert has the number to coordinators' office should they have any questions. RTRN

## 2023-05-12 ENCOUNTER — DOCUMENTATION (OUTPATIENT)
Dept: CARDIOLOGY | Facility: HOSPITAL | Age: 83
End: 2023-05-12
Payer: MEDICARE

## 2023-05-12 ENCOUNTER — ANESTHESIA EVENT (OUTPATIENT)
Dept: PERIOP | Facility: HOSPITAL | Age: 83
End: 2023-05-12
Payer: MEDICARE

## 2023-05-12 ENCOUNTER — HOSPITAL ENCOUNTER (OUTPATIENT)
Dept: GENERAL RADIOLOGY | Facility: HOSPITAL | Age: 83
Discharge: HOME | End: 2023-05-12
Payer: MEDICARE

## 2023-05-12 ENCOUNTER — PRE-ADMISSION TESTING (OUTPATIENT)
Dept: PREADMISSION TESTING | Facility: HOSPITAL | Age: 83
End: 2023-05-12
Payer: MEDICARE

## 2023-05-12 VITALS
RESPIRATION RATE: 16 BRPM | TEMPERATURE: 97.9 F | WEIGHT: 136.8 LBS | OXYGEN SATURATION: 98 % | SYSTOLIC BLOOD PRESSURE: 139 MMHG | HEIGHT: 60 IN | BODY MASS INDEX: 26.86 KG/M2 | HEART RATE: 66 BPM | DIASTOLIC BLOOD PRESSURE: 73 MMHG

## 2023-05-12 DIAGNOSIS — I35.0 AORTIC STENOSIS, SEVERE: ICD-10-CM

## 2023-05-12 LAB
ABO GROUP BLD: NORMAL
ALBUMIN SERPL-MCNC: 3.9 G/DL (ref 3.5–5.2)
ALBUMIN/GLOB SERPL: 1.5 G/DL
ALP SERPL-CCNC: 95 U/L (ref 39–117)
ALT SERPL W P-5'-P-CCNC: 34 U/L (ref 1–33)
ANION GAP SERPL CALCULATED.3IONS-SCNC: 9.5 MMOL/L (ref 5–15)
APTT PPP: 30.9 SECONDS (ref 22.7–35.4)
AST SERPL-CCNC: 33 U/L (ref 1–32)
BACTERIA UR QL AUTO: ABNORMAL /HPF
BASOPHILS # BLD AUTO: 0.04 10*3/MM3 (ref 0–0.2)
BASOPHILS NFR BLD AUTO: 0.7 % (ref 0–1.5)
BILIRUB SERPL-MCNC: 0.3 MG/DL (ref 0–1.2)
BILIRUB UR QL STRIP: NEGATIVE
BLD GP AB SCN SERPL QL: NEGATIVE
BUN SERPL-MCNC: 13 MG/DL (ref 8–23)
BUN/CREAT SERPL: 15.9 (ref 7–25)
CALCIUM SPEC-SCNC: 9.4 MG/DL (ref 8.6–10.5)
CHLORIDE SERPL-SCNC: 104 MMOL/L (ref 98–107)
CLARITY UR: CLEAR
CLOSE TME COLL+ADP + EPINEP PNL BLD: 98 % (ref 86–100)
CO2 SERPL-SCNC: 28.5 MMOL/L (ref 22–29)
COLOR UR: YELLOW
CREAT SERPL-MCNC: 0.82 MG/DL (ref 0.57–1)
DEPRECATED RDW RBC AUTO: 43.5 FL (ref 37–54)
EGFRCR SERPLBLD CKD-EPI 2021: 70.6 ML/MIN/1.73
EOSINOPHIL # BLD AUTO: 0.27 10*3/MM3 (ref 0–0.4)
EOSINOPHIL NFR BLD AUTO: 4.5 % (ref 0.3–6.2)
ERYTHROCYTE [DISTWIDTH] IN BLOOD BY AUTOMATED COUNT: 12.9 % (ref 12.3–15.4)
GLOBULIN UR ELPH-MCNC: 2.6 GM/DL
GLUCOSE SERPL-MCNC: 90 MG/DL (ref 65–99)
GLUCOSE UR STRIP-MCNC: NEGATIVE MG/DL
HBA1C MFR BLD: 4.9 % (ref 4.8–5.6)
HCT VFR BLD AUTO: 39.5 % (ref 34–46.6)
HGB BLD-MCNC: 13.2 G/DL (ref 12–15.9)
HGB UR QL STRIP.AUTO: NEGATIVE
HYALINE CASTS UR QL AUTO: ABNORMAL /LPF
IMM GRANULOCYTES # BLD AUTO: 0.01 10*3/MM3 (ref 0–0.05)
IMM GRANULOCYTES NFR BLD AUTO: 0.2 % (ref 0–0.5)
INR PPP: 1.06 (ref 0.9–1.1)
KETONES UR QL STRIP: ABNORMAL
LEUKOCYTE ESTERASE UR QL STRIP.AUTO: ABNORMAL
LYMPHOCYTES # BLD AUTO: 1.27 10*3/MM3 (ref 0.7–3.1)
LYMPHOCYTES NFR BLD AUTO: 21 % (ref 19.6–45.3)
MCH RBC QN AUTO: 31 PG (ref 26.6–33)
MCHC RBC AUTO-ENTMCNC: 33.4 G/DL (ref 31.5–35.7)
MCV RBC AUTO: 92.7 FL (ref 79–97)
MONOCYTES # BLD AUTO: 0.53 10*3/MM3 (ref 0.1–0.9)
MONOCYTES NFR BLD AUTO: 8.8 % (ref 5–12)
NEUTROPHILS NFR BLD AUTO: 3.92 10*3/MM3 (ref 1.7–7)
NEUTROPHILS NFR BLD AUTO: 64.8 % (ref 42.7–76)
NITRITE UR QL STRIP: NEGATIVE
NRBC BLD AUTO-RTO: 0 /100 WBC (ref 0–0.2)
NT-PROBNP SERPL-MCNC: 4790 PG/ML (ref 0–1800)
PH UR STRIP.AUTO: 5.5 [PH] (ref 5–8)
PLATELET # BLD AUTO: 274 10*3/MM3 (ref 140–450)
PMV BLD AUTO: 9.4 FL (ref 6–12)
POTASSIUM SERPL-SCNC: 3.7 MMOL/L (ref 3.5–5.2)
PROT SERPL-MCNC: 6.5 G/DL (ref 6–8.5)
PROT UR QL STRIP: NEGATIVE
PROTHROMBIN TIME: 13.9 SECONDS (ref 11.7–14.2)
QT INTERVAL: 416 MS
RBC # BLD AUTO: 4.26 10*6/MM3 (ref 3.77–5.28)
RBC # UR STRIP: ABNORMAL /HPF
REF LAB TEST METHOD: ABNORMAL
RH BLD: POSITIVE
SARS-COV-2 ORF1AB RESP QL NAA+PROBE: NOT DETECTED
SODIUM SERPL-SCNC: 142 MMOL/L (ref 136–145)
SP GR UR STRIP: 1.01 (ref 1–1.03)
SQUAMOUS #/AREA URNS HPF: ABNORMAL /HPF
T&S EXPIRATION DATE: NORMAL
UROBILINOGEN UR QL STRIP: ABNORMAL
WBC # UR STRIP: ABNORMAL /HPF
WBC NRBC COR # BLD: 6.04 10*3/MM3 (ref 3.4–10.8)

## 2023-05-12 PROCEDURE — 86850 RBC ANTIBODY SCREEN: CPT

## 2023-05-12 PROCEDURE — 93005 ELECTROCARDIOGRAM TRACING: CPT

## 2023-05-12 PROCEDURE — 86901 BLOOD TYPING SEROLOGIC RH(D): CPT

## 2023-05-12 PROCEDURE — 80053 COMPREHEN METABOLIC PANEL: CPT

## 2023-05-12 PROCEDURE — 85610 PROTHROMBIN TIME: CPT

## 2023-05-12 PROCEDURE — 87635 SARS-COV-2 COVID-19 AMP PRB: CPT

## 2023-05-12 PROCEDURE — 85730 THROMBOPLASTIN TIME PARTIAL: CPT

## 2023-05-12 PROCEDURE — 86900 BLOOD TYPING SEROLOGIC ABO: CPT

## 2023-05-12 PROCEDURE — 85576 BLOOD PLATELET AGGREGATION: CPT

## 2023-05-12 PROCEDURE — 83880 ASSAY OF NATRIURETIC PEPTIDE: CPT

## 2023-05-12 PROCEDURE — 85025 COMPLETE CBC W/AUTO DIFF WBC: CPT

## 2023-05-12 PROCEDURE — 81001 URINALYSIS AUTO W/SCOPE: CPT

## 2023-05-12 PROCEDURE — 83036 HEMOGLOBIN GLYCOSYLATED A1C: CPT

## 2023-05-12 PROCEDURE — 71046 X-RAY EXAM CHEST 2 VIEWS: CPT

## 2023-05-12 PROCEDURE — 36415 COLL VENOUS BLD VENIPUNCTURE: CPT

## 2023-05-12 RX ORDER — CHLORHEXIDINE GLUCONATE 0.12 MG/ML
15 RINSE ORAL 2 TIMES DAILY
COMMUNITY
End: 2023-05-17 | Stop reason: HOSPADM

## 2023-05-12 RX ORDER — CHLORHEXIDINE GLUCONATE 0.12 MG/ML
15 RINSE ORAL EVERY 12 HOURS
Status: DISPENSED | OUTPATIENT
Start: 2023-05-12 | End: 2023-05-13

## 2023-05-12 RX ORDER — MULTIVIT WITH MINERALS/LUTEIN
250 TABLET ORAL DAILY
COMMUNITY

## 2023-05-12 NOTE — ANESTHESIA PREPROCEDURE EVALUATION
Anesthesia Evaluation     history of anesthetic complications (reports awareness under anesthesia during hysterectomy several years ago):  NPO Solid Status: > 8 hours             Airway   Mallampati: II  TM distance: >3 FB  Neck ROM: full  Dental    (+) upper dentures    Pulmonary - normal exam   Cardiovascular - normal exam    (+) hypertension, valvular problems/murmurs AS, CHF Diastolic >=55%, MIRANDA,       Neuro/Psych  (+) tremors (essential tremor),    GI/Hepatic/Renal/Endo    (+)   thyroid problem hypothyroidism    Musculoskeletal     Abdominal    Substance History      OB/GYN          Other                        Anesthesia Plan    ASA 4     MAC     (Arterial line, +- IJ central line    Would like to avoid benzodiazepenes if possible)    Anesthetic plan, risks, benefits, and alternatives have been provided, discussed and informed consent has been obtained with: patient.        CODE STATUS:

## 2023-05-12 NOTE — NURSING NOTE
I met with Mrs Hobson and her daughter in-law Briseyda while she was here for her preadmission testing We completed the [facility] and 15 foot walk test She has been provided oral and nasal medication and instructions for use prior to her TAVR procedure scheduled for 5/16/23 with a 5am arrival time. Ms Hobson lives alone, ambulates without an assistive device and does not requoire supplemental oxygen. Her skin is dry and shows no signs of infection specifically in the groin access/site area. We have discussed the TAVR procedure and I have provided her with our contact information should she have any further questions. She has not been hospitalized during the last twelve months for heart failure

## 2023-05-12 NOTE — DISCHARGE INSTRUCTIONS
Take the following medications the morning of surgery with a small sip of water: NONE    ARRIVE AT 5:00 AM.      If you are on prescription narcotic pain medication to control your pain you may also take that medication the morning of surgery.    General Instructions:  Do not eat or drink anything after midnight the night before surgery.  Patients who avoid smoking, chewing tobacco and alcohol for 4 weeks prior to surgery have a reduced risk of post-operative complications.  Quit smoking as many days before surgery as you can.  Do not smoke, use chewing tobacco or drink alcohol the day of surgery.   If applicable bring your C-PAP/ BI-PAP machine in with you to preop day of surgery.  Bring any papers given to you in the doctor’s office.  Wear clean comfortable clothes.  Do not wear contact lenses, false eyelashes or make-up.  Bring a case for your glasses.   Bring crutches or walker if applicable.  Remove all piercings.  Leave jewelry and any other valuables at home.  Hair extensions with metal clips must be removed prior to surgery.  The Pre-Admission Testing nurse will instruct you to bring medications if unable to obtain an accurate list in Pre-Admission Testing.        If you were given a blood bank ID arm band remember to bring it with you the day of surgery.    Preventing a Surgical Site Infection:  For 2 to 3 days before surgery, avoid shaving with a razor because the razor can irritate skin and make it easier to develop an infection.    Any areas of open skin can increase the risk of a post-operative wound infection by allowing bacteria to enter and travel throughout the body.  Notify your surgeon if you have any skin wounds / rashes even if it is not near the expected surgical site.  The area will need assessed to determine if surgery should be delayed until it is healed.  The night prior to surgery shower using a fresh bar of anti-bacterial soap (such as Dial) and clean washcloth.  Sleep in a clean bed with  clean clothing.  Do not allow pets to sleep with you.  Shower on the morning of surgery using a fresh bar of anti-bacterial soap (such as Dial) and clean washcloth.  Dry with a clean towel and dress in clean clothing.    BACTROBAN NASAL OINTMENT  There are many germs normally in your nose. Bactroban is an ointment that will help reduce these germs. Please follow these instructions for Bactroban use:      ____The day before surgery in the evening              Date________    ____The day of surgery in the morning    Date________    **Squirt ½ package of Bactroban Ointment onto a cotton applicator and apply to inside of 1st nostril.  Squirt the remaining Bactroban and apply to the inside of the other nostril.    PERIDEX- ORAL:  Use only if your surgeon has ordered  Use the night before and morning of surgery - Swish, gargle, and spit - do not swallow.   Ask your surgeon if you will be receiving antibiotics prior to surgery.  Make sure you, your family, and all healthcare providers clean their hands with soap and water or an alcohol based hand  before caring for you or your wound.    Day of surgery:  Your arrival time is approximately two hours before your scheduled surgery time.  Upon arrival, a Pre-op nurse and Anesthesiologist will review your health history, obtain vital signs, and answer questions you may have.  The only belongings needed at this time will be your home medications and if applicable your C-PAP/BI-PAP machine.  A Pre-op nurse will start an IV and you may receive medication in preparation for surgery, including something to help you relax.      Please be aware that surgery does come with discomfort.  We want to make every effort to control your discomfort so please discuss any uncontrolled symptoms with your nurse.   Your doctor will most likely have prescribed pain medications.      If you are going home after surgery you will receive individualized written care instructions before being  discharged.  A responsible adult must drive you to and from the hospital on the day of your surgery and stay with you for 24 hours.  Discharge prescriptions can be filled by the hospital pharmacy during regular pharmacy hours.  If you are having surgery late in the day/evening your prescription may be e-prescribed to your pharmacy.  Please verify your pharmacy hours or chose a 24 hour pharmacy to avoid not having access to your prescription because your pharmacy has closed for the day.    If you are staying overnight following surgery, you will be transported to your hospital room following the recovery period.  [facility] has all private rooms.    If you have any questions please call Pre-Admission Testing at [phone].  Deductibles and co-payments are collected on the day of service. Please be prepared to pay the required co-pay, deductible or deposit on the day of service as defined by your plan.    Call your surgeon immediately if you experience any of the following symptoms:  Sore Throat  Shortness of Breath or difficulty breathing  Cough  Chills  Body soreness or muscle pain  Headache  Fever  New loss of taste or smell  Do not arrive for your surgery ill.  Your procedure will need to be rescheduled to another time.  You will need to call your physician before the day of surgery to avoid any unnecessary exposure to hospital staff as well as other patients.

## 2023-05-15 NOTE — PROGRESS NOTES
5/15/2023    Seen on 5/9/2023    Chief Complaint     Dyspnea, evaluation of aortic stenosis    History of Present Illness:       Dear Denita and Colleagues,  It was nice to see Sigrid Hobson in consultation at your request. She is a 82 y.o. female with seizures, hypertension, hypothyroidism, tremors, and a heart murmur who has developed progressive dyspnea exertion and fatigue.  She states that over the last 6 months she has decline and she has trouble climbing stairs. She denies syncope, TIA, orthopnea, PND or lower extremity edema.  She had a 2D echocardiogram that showed an abnormal aortic valve with mild regurgitation and severe stenosis with an aortic valve area 0.46 cm², mean gradient of 44 mmHg and peak velocity of 4 m/s.  The ejection fraction was preserved.  She had a cardiac cath that showed no significant coronary artery disease except for an 80% ostial first diagonal.  She had a TAVR CTA that showed feasibility for iliofemoral disease and implant ability in the aortic valve.  She denies a family history of aneurysms, dissections or connective tissue disorders.  She denies rheumatic or scarlet fever.      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       Past Medical History:   Diagnosis Date   • Awareness under anesthesia     STATES HARD TO PUT UNDER   • Heart valve disease    • Hypertension    • Hypothyroid    • Seizure 1980    UNKNOWN CAUSE   • Shingles    • Shortness of breath     UPON EXERTION   • Tremors of nervous system        Past Surgical History:   Procedure Laterality Date   • APPENDECTOMY     • CARDIAC CATHETERIZATION Left 05/03/2023    Procedure: Cardiac Catheterization/Vascular Study;  Surgeon: Denita Calloway MD;  Location: [facility] INVASIVE LOCATION;  Service: Cardiovascular;  Laterality: Left;   • CARDIAC CATHETERIZATION N/A 05/03/2023    Procedure: Coronary angiography;  Surgeon: Brodie  MD Denita;  Location: [facility] LOCATION;  Service: Cardiovascular;  Laterality: N/A;   • HYSTERECTOMY     • TUBAL ABDOMINAL LIGATION         Allergies   Allergen Reactions   • Diazepam Mental Status Change   • Hydrocodone Mental Status Change   • Milk-Related Compounds Diarrhea   • Promethazine Mental Status Change   • Quinine Derivatives Hives         Current Outpatient Medications:   •  aspirin 81 MG EC tablet, Take 1 tablet by mouth Daily. (Patient taking differently: Take 1 tablet by mouth Daily. HOLD PER MD INSTRUCTIONS PRIOR TO SURGERY), Disp: , Rfl:   •  FLUoxetine (PROzac) 20 MG capsule, TAKE 1 CAPSULE BY MOUTH ONCE DAILY, Disp: 90 capsule, Rfl: 0  •  fluticasone (FLONASE) 50 MCG/ACT nasal spray, As Needed for Allergies., Disp: , Rfl:   •  furosemide (LASIX) 40 MG tablet, Take 1 tablet by mouth Daily., Disp: 30 tablet, Rfl: 11  •  gabapentin (NEURONTIN) 300 MG capsule, Take 1 capsule by mouth 3 (Three) Times a Day., Disp: , Rfl:   •  levothyroxine (SYNTHROID, LEVOTHROID) 25 MCG tablet, Take 2 tablets by mouth Daily., Disp: 60 tablet, Rfl: 2  •  liothyronine (CYTOMEL) 5 MCG tablet, TAKE 1 TABLET(5 MCG) BY MOUTH 1 TIME EACH DAY, Disp: , Rfl:   •  metroNIDAZOLE (METROCREAM) 0.75 % cream, APPLY TO AREAS OF SKIN AFFECTED BY ROSACEA EVERY DAY, Disp: , Rfl:   •  multivitamin (THERAGRAN) tablet tablet, Take  by mouth Daily. HOLD FOR SURGERY, Disp: , Rfl:   •  valACYclovir (VALTREX) 500 MG tablet, Take 1 tablet by mouth Daily., Disp: , Rfl:   •  Calcium Citrate-Vitamin D (Citrus Calcium/Vitamin D) 200-6.25 MG-MCG tablet, Take  by mouth Daily. HOLD FOR SURGERY, Disp: , Rfl:   •  chlorhexidine (PERIDEX) 0.12 % solution, Apply 15 mL to the mouth or throat 2 (Two) Times a Day. PRIOR TO SURGERY, Disp: , Rfl:   •  folic acid-vit B6-vit B12 (FOLTABS) 0.8-10-0.115 MG tablet tablet, Take  by mouth Daily. HOLD FOR SURGERY, Disp: , Rfl:   •  Linoleic Acid-Sunflower Oil 500-1000 MG capsule, Take  by mouth. HOLD FOR  SURGERY, Disp: , Rfl:   •  mupirocin (BACTROBAN) 2 % nasal ointment, into the nostril(s) as directed by provider Take As Directed. PRIOR TO SURGERY, Disp: , Rfl:   •  Potassium 99 MG tablet, Take  by mouth Take As Directed. PRIOR TO SURGERY, Disp: , Rfl:   •  vitamin C (ASCORBIC ACID) 250 MG tablet, Take 1 tablet by mouth Daily. HOLD FOR SURGERY, Disp: , Rfl:   •  vitamin E 100 UNIT capsule, Take 1 capsule by mouth Daily. HOLD FOR SURGERY, Disp: , Rfl:     Social History     Socioeconomic History   • Marital status:    Tobacco Use   • Smoking status: Never     Passive exposure: Never   • Smokeless tobacco: Never   Vaping Use   • Vaping Use: Never used   Substance and Sexual Activity   • Alcohol use: Never   • Drug use: Never   • Sexual activity: Not Currently       Family History   Problem Relation Age of Onset   • Malig Hyperthermia Neg Hx      Review of Systems   Constitutional: Positive for fatigue.   Respiratory: Positive for shortness of breath.    Cardiovascular: Negative for chest pain, palpitations and leg swelling.   Musculoskeletal: Negative for gait problem.   Neurological: Positive for weakness. Negative for dizziness and syncope.   All other systems reviewed and are negative.      Physical Exam:    Vital Signs:  Weight: 59 kg (130 lb)   Body mass index is 25.39 kg/m².  Temp: 97.1 °F (36.2 °C)   Heart Rate: 75   BP: 123/73     Constitutional:       Appearance: Well-developed.   Eyes:      Conjunctiva/sclera: Conjunctivae normal.      Pupils: Pupils are equal, round, and reactive to light.   HENT:      Head: Normocephalic and atraumatic.      Nose: Nose normal.   Neck:      Thyroid: No thyromegaly.      Vascular: No JVD.      Lymphadenopathy: No cervical adenopathy.   Pulmonary:      Effort: Pulmonary effort is normal.      Breath sounds: Normal breath sounds. No rales.   Cardiovascular:      Normal rate. Regular rhythm.      Murmurs: There is a grade 3/6 harsh midsystolic murmur at the URSB,  radiating to the neck.      No gallop.   Pulses:     Intact distal pulses. No decreased pulses.   Edema:     Peripheral edema absent.   Abdominal:      General: Bowel sounds are normal. There is no distension.      Palpations: Abdomen is soft. There is no abdominal mass.      Tenderness: There is no abdominal tenderness.   Musculoskeletal: Normal range of motion.         General: No tenderness or deformity.      Cervical back: Normal range of motion and neck supple. Skin:     General: Skin is warm and dry.      Findings: No erythema or rash.   Neurological:      Mental Status: Alert and oriented to person, place, and time.      Deep Tendon Reflexes: Reflexes are normal and symmetric.   Psychiatric:         Behavior: Behavior normal.          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 Severe nonrheumatic calcific aortic stenosis with proven objective parameters and development of symptoms such as dyspnea and fatigue.  I recommend TAVR over SAVR due to age, frailty and moderate risk for surgery and difficulty recovering.  I discussed the risks (STS calculated), benefits and (of the procedure and she wishes to proceed.  I discussed the option of open rescue and she verbalized agreement.  2. Diastolic congestive heart failure with [place] Heart Association symptoms class III.  Plan for TAVR  3. Hypertension, well controlled continue present treatment    Thank you for allowing me to participate in her care.    Regards,    Cole Cohen M.D.    Spent over 65 minutes before, during  and after the office visit in reviewing the records, examining the patient, reviewing and interpreting myself the echocardiogram, the cardiac cath, the CT scan for TAVR evaluation, spent time in discussing the findings and options with the patient and family, discussing the procedure in detail, discussing my recommendation of TAVR over SAVR and spent time in documenting in the electronic record

## 2023-05-16 ENCOUNTER — HOSPITAL ENCOUNTER (INPATIENT)
Facility: HOSPITAL | Age: 83
LOS: 1 days | Discharge: HOME | End: 2023-05-17
Attending: THORACIC SURGERY (CARDIOTHORACIC VASCULAR SURGERY) | Admitting: THORACIC SURGERY (CARDIOTHORACIC VASCULAR SURGERY)
Payer: MEDICARE

## 2023-05-16 ENCOUNTER — ANCILLARY PROCEDURE (OUTPATIENT)
Dept: PERIOP | Facility: HOSPITAL | Age: 83
End: 2023-05-16
Payer: MEDICARE

## 2023-05-16 ENCOUNTER — ANESTHESIA (OUTPATIENT)
Dept: PERIOP | Facility: HOSPITAL | Age: 83
End: 2023-05-16
Payer: MEDICARE

## 2023-05-16 DIAGNOSIS — I50.30 DIASTOLIC CHF DUE TO VALVULAR DISEASE: ICD-10-CM

## 2023-05-16 DIAGNOSIS — I38 DIASTOLIC CHF DUE TO VALVULAR DISEASE: ICD-10-CM

## 2023-05-16 DIAGNOSIS — Z95.2 S/P TAVR (TRANSCATHETER AORTIC VALVE REPLACEMENT): Primary | ICD-10-CM

## 2023-05-16 DIAGNOSIS — I35.0 AORTIC STENOSIS, SEVERE: ICD-10-CM

## 2023-05-16 LAB
ACT BLD: 131 SECONDS (ref 82–152)
ACT BLD: 155 SECONDS (ref 82–152)
ACT BLD: 347 SECONDS (ref 82–152)
AORTIC DIMENSIONLESS INDEX: 0.7 (DI)
BASE EXCESS BLDA CALC-SCNC: -2 MMOL/L (ref -5–5)
BASE EXCESS BLDA CALC-SCNC: 4 MMOL/L (ref -5–5)
BH CV ECHO MEAS - AO MAX PG: 9.1 MMHG
BH CV ECHO MEAS - AO MEAN PG: 4.5 MMHG
BH CV ECHO MEAS - AO V2 MAX: 150.8 CM/SEC
BH CV ECHO MEAS - AO V2 VTI: 33.5 CM
BH CV ECHO MEAS - AVA(I,D): 1.42 CM2
BH CV ECHO MEAS - LV MAX PG: 3 MMHG
BH CV ECHO MEAS - LV MEAN PG: 1.29 MMHG
BH CV ECHO MEAS - LV V1 MAX: 86.5 CM/SEC
BH CV ECHO MEAS - LV V1 VTI: 22.9 CM
BH CV ECHO MEAS - LVOT AREA: 2.09 CM2
BH CV ECHO MEAS - LVOT DIAM: 1.63 CM
BH CV ECHO MEAS - SV(LVOT): 47.7 ML
CA-I BLDA-SCNC: ABNORMAL MMOL/L
CA-I BLDA-SCNC: ABNORMAL MMOL/L
CO2 BLDA-SCNC: 26 MMOL/L (ref 24–29)
CO2 BLDA-SCNC: 31 MMOL/L (ref 24–29)
GLUCOSE BLDC GLUCOMTR-MCNC: 128 MG/DL (ref 70–130)
GLUCOSE BLDC GLUCOMTR-MCNC: 79 MG/DL (ref 70–130)
GLUCOSE BLDC GLUCOMTR-MCNC: 93 MG/DL (ref 70–130)
HCO3 BLDA-SCNC: 24.7 MMOL/L (ref 22–26)
HCO3 BLDA-SCNC: 29.2 MMOL/L (ref 22–26)
HCT VFR BLDA CALC: 29 % (ref 38–51)
HCT VFR BLDA CALC: 35 % (ref 38–51)
HGB BLDA-MCNC: 11.9 G/DL (ref 12–17)
HGB BLDA-MCNC: 9.9 G/DL (ref 12–17)
PCO2 BLDA: 48.4 MM HG (ref 35–45)
PCO2 BLDA: 49.5 MM HG (ref 35–45)
PH BLDA: 7.32 PH UNITS (ref 7.35–7.6)
PH BLDA: 7.38 PH UNITS (ref 7.35–7.6)
PO2 BLDA: 183 MMHG (ref 80–105)
PO2 BLDA: 71 MMHG (ref 80–105)
POTASSIUM BLDA-SCNC: 3.5 MMOL/L (ref 3.5–4.9)
POTASSIUM BLDA-SCNC: 4 MMOL/L (ref 3.5–4.9)
QT INTERVAL: 473 MS
SAO2 % BLDA: 100 % (ref 95–98)
SAO2 % BLDA: 92 % (ref 95–98)

## 2023-05-16 PROCEDURE — 85347 COAGULATION TIME ACTIVATED: CPT

## 2023-05-16 PROCEDURE — C1889 IMPLANT/INSERT DEVICE, NOC: HCPCS | Performed by: THORACIC SURGERY (CARDIOTHORACIC VASCULAR SURGERY)

## 2023-05-16 PROCEDURE — C1769 GUIDE WIRE: HCPCS | Performed by: THORACIC SURGERY (CARDIOTHORACIC VASCULAR SURGERY)

## 2023-05-16 PROCEDURE — 02RF38Z REPLACEMENT OF AORTIC VALVE WITH ZOOPLASTIC TISSUE, PERCUTANEOUS APPROACH: ICD-10-PCS | Performed by: THORACIC SURGERY (CARDIOTHORACIC VASCULAR SURGERY)

## 2023-05-16 PROCEDURE — 93321 DOPPLER ECHO F-UP/LMTD STD: CPT

## 2023-05-16 PROCEDURE — C1760 CLOSURE DEV, VASC: HCPCS

## 2023-05-16 PROCEDURE — C1894 INTRO/SHEATH, NON-LASER: HCPCS | Performed by: THORACIC SURGERY (CARDIOTHORACIC VASCULAR SURGERY)

## 2023-05-16 PROCEDURE — C1725 CATH, TRANSLUMIN NON-LASER: HCPCS | Performed by: THORACIC SURGERY (CARDIOTHORACIC VASCULAR SURGERY)

## 2023-05-16 PROCEDURE — 25010000002 CEFAZOLIN IN DEXTROSE 2-4 GM/100ML-% SOLUTION: Performed by: NURSE PRACTITIONER

## 2023-05-16 PROCEDURE — 25010000002 MIDAZOLAM PER 1 MG: Performed by: ANESTHESIOLOGY

## 2023-05-16 PROCEDURE — 85018 HEMOGLOBIN: CPT

## 2023-05-16 PROCEDURE — 25010000002 PROTAMINE SULFATE PER 10 MG: Performed by: ANESTHESIOLOGY

## 2023-05-16 PROCEDURE — 93010 ELECTROCARDIOGRAM REPORT: CPT | Performed by: INTERNAL MEDICINE

## 2023-05-16 PROCEDURE — 25010000002 HYDRALAZINE PER 20 MG

## 2023-05-16 PROCEDURE — 25010000002 PROPOFOL 10 MG/ML EMULSION: Performed by: ANESTHESIOLOGY

## 2023-05-16 PROCEDURE — 25010000002 PHENYLEPHRINE 10 MG/ML SOLUTION 5 ML VIAL: Performed by: ANESTHESIOLOGY

## 2023-05-16 PROCEDURE — 25510000001 IOPAMIDOL PER 1 ML: Performed by: THORACIC SURGERY (CARDIOTHORACIC VASCULAR SURGERY)

## 2023-05-16 PROCEDURE — B41D1ZZ FLUOROSCOPY OF AORTA AND BILATERAL LOWER EXTREMITY ARTERIES USING LOW OSMOLAR CONTRAST: ICD-10-PCS | Performed by: THORACIC SURGERY (CARDIOTHORACIC VASCULAR SURGERY)

## 2023-05-16 PROCEDURE — 25010000002 ONDANSETRON PER 1 MG: Performed by: INTERNAL MEDICINE

## 2023-05-16 PROCEDURE — 93005 ELECTROCARDIOGRAM TRACING: CPT | Performed by: INTERNAL MEDICINE

## 2023-05-16 PROCEDURE — C1760 CLOSURE DEV, VASC: HCPCS | Performed by: THORACIC SURGERY (CARDIOTHORACIC VASCULAR SURGERY)

## 2023-05-16 PROCEDURE — 82948 REAGENT STRIP/BLOOD GLUCOSE: CPT

## 2023-05-16 PROCEDURE — 93308 TTE F-UP OR LMTD: CPT

## 2023-05-16 PROCEDURE — 25010000002 HYDRALAZINE PER 20 MG: Performed by: ANESTHESIOLOGY

## 2023-05-16 PROCEDURE — 02HV33Z INSERTION OF INFUSION DEVICE INTO SUPERIOR VENA CAVA, PERCUTANEOUS APPROACH: ICD-10-PCS | Performed by: THORACIC SURGERY (CARDIOTHORACIC VASCULAR SURGERY)

## 2023-05-16 PROCEDURE — 85014 HEMATOCRIT: CPT

## 2023-05-16 PROCEDURE — 25010000002 HEPARIN (PORCINE) PER 1000 UNITS: Performed by: ANESTHESIOLOGY

## 2023-05-16 PROCEDURE — 25010000002 FENTANYL CITRATE (PF) 50 MCG/ML SOLUTION: Performed by: ANESTHESIOLOGY

## 2023-05-16 PROCEDURE — 82947 ASSAY GLUCOSE BLOOD QUANT: CPT

## 2023-05-16 PROCEDURE — 93325 DOPPLER ECHO COLOR FLOW MAPG: CPT

## 2023-05-16 PROCEDURE — 82803 BLOOD GASES ANY COMBINATION: CPT

## 2023-05-16 PROCEDURE — 4A133BC MONITORING OF ARTERIAL PRESSURE, CORONARY, PERCUTANEOUS APPROACH: ICD-10-PCS | Performed by: THORACIC SURGERY (CARDIOTHORACIC VASCULAR SURGERY)

## 2023-05-16 DEVICE — VLV HEART TRNSCATH SAPIEN/COMMANDER 23MM: Type: IMPLANTABLE DEVICE | Site: AORTA | Status: FUNCTIONAL

## 2023-05-16 RX ORDER — ACETAMINOPHEN 325 MG/1
650 TABLET ORAL EVERY 4 HOURS PRN
Status: DISCONTINUED | OUTPATIENT
Start: 2023-05-16 | End: 2023-05-17 | Stop reason: HOSPADM

## 2023-05-16 RX ORDER — LIDOCAINE HYDROCHLORIDE 10 MG/ML
0.5 INJECTION, SOLUTION EPIDURAL; INFILTRATION; INTRACAUDAL; PERINEURAL ONCE AS NEEDED
Status: DISCONTINUED | OUTPATIENT
Start: 2023-05-16 | End: 2023-05-16 | Stop reason: HOSPADM

## 2023-05-16 RX ORDER — KETAMINE HCL IN NACL, ISO-OSM 100MG/10ML
SYRINGE (ML) INJECTION AS NEEDED
Status: DISCONTINUED | OUTPATIENT
Start: 2023-05-16 | End: 2023-05-16 | Stop reason: SURG

## 2023-05-16 RX ORDER — HYDROCODONE BITARTRATE AND ACETAMINOPHEN 5; 325 MG/1; MG/1
1 TABLET ORAL ONCE AS NEEDED
Status: DISCONTINUED | OUTPATIENT
Start: 2023-05-16 | End: 2023-05-16

## 2023-05-16 RX ORDER — HYDRALAZINE HYDROCHLORIDE 20 MG/ML
5 INJECTION INTRAMUSCULAR; INTRAVENOUS
Status: DISCONTINUED | OUTPATIENT
Start: 2023-05-16 | End: 2023-05-16

## 2023-05-16 RX ORDER — EPHEDRINE SULFATE 50 MG/ML
5 INJECTION, SOLUTION INTRAVENOUS ONCE AS NEEDED
Status: DISCONTINUED | OUTPATIENT
Start: 2023-05-16 | End: 2023-05-16

## 2023-05-16 RX ORDER — NITROGLYCERIN 5 MG/ML
INJECTION, SOLUTION INTRAVENOUS AS NEEDED
Status: DISCONTINUED | OUTPATIENT
Start: 2023-05-16 | End: 2023-05-16 | Stop reason: SURG

## 2023-05-16 RX ORDER — MIDAZOLAM HYDROCHLORIDE 1 MG/ML
INJECTION INTRAMUSCULAR; INTRAVENOUS AS NEEDED
Status: DISCONTINUED | OUTPATIENT
Start: 2023-05-16 | End: 2023-05-16 | Stop reason: SURG

## 2023-05-16 RX ORDER — PROTAMINE SULFATE 10 MG/ML
INJECTION, SOLUTION INTRAVENOUS AS NEEDED
Status: DISCONTINUED | OUTPATIENT
Start: 2023-05-16 | End: 2023-05-16 | Stop reason: SURG

## 2023-05-16 RX ORDER — FLUOXETINE HYDROCHLORIDE 20 MG/1
20 CAPSULE ORAL DAILY
Status: DISCONTINUED | OUTPATIENT
Start: 2023-05-16 | End: 2023-05-17 | Stop reason: HOSPADM

## 2023-05-16 RX ORDER — CEFAZOLIN SODIUM 2 G/100ML
2 INJECTION, SOLUTION INTRAVENOUS ONCE
Status: COMPLETED | OUTPATIENT
Start: 2023-05-16 | End: 2023-05-16

## 2023-05-16 RX ORDER — LIDOCAINE HYDROCHLORIDE 20 MG/ML
INJECTION, SOLUTION INFILTRATION; PERINEURAL AS NEEDED
Status: DISCONTINUED | OUTPATIENT
Start: 2023-05-16 | End: 2023-05-16 | Stop reason: HOSPADM

## 2023-05-16 RX ORDER — LOSARTAN POTASSIUM 25 MG/1
25 TABLET ORAL
Status: DISCONTINUED | OUTPATIENT
Start: 2023-05-16 | End: 2023-05-16

## 2023-05-16 RX ORDER — PROMETHAZINE HYDROCHLORIDE 25 MG/1
25 SUPPOSITORY RECTAL ONCE AS NEEDED
Status: DISCONTINUED | OUTPATIENT
Start: 2023-05-16 | End: 2023-05-16

## 2023-05-16 RX ORDER — NITROGLYCERIN 0.4 MG/1
0.4 TABLET SUBLINGUAL
Status: DISCONTINUED | OUTPATIENT
Start: 2023-05-16 | End: 2023-05-17 | Stop reason: HOSPADM

## 2023-05-16 RX ORDER — LABETALOL HYDROCHLORIDE 5 MG/ML
5 INJECTION, SOLUTION INTRAVENOUS
Status: DISCONTINUED | OUTPATIENT
Start: 2023-05-16 | End: 2023-05-16

## 2023-05-16 RX ORDER — FENTANYL CITRATE 50 UG/ML
INJECTION, SOLUTION INTRAMUSCULAR; INTRAVENOUS AS NEEDED
Status: DISCONTINUED | OUTPATIENT
Start: 2023-05-16 | End: 2023-05-16 | Stop reason: SURG

## 2023-05-16 RX ORDER — SODIUM CHLORIDE 9 MG/ML
50 INJECTION, SOLUTION INTRAVENOUS CONTINUOUS
Status: ACTIVE | OUTPATIENT
Start: 2023-05-16 | End: 2023-05-17

## 2023-05-16 RX ORDER — LEVOTHYROXINE SODIUM 0.05 MG/1
50 TABLET ORAL DAILY
Status: DISCONTINUED | OUTPATIENT
Start: 2023-05-16 | End: 2023-05-17 | Stop reason: HOSPADM

## 2023-05-16 RX ORDER — HYDROCODONE BITARTRATE AND ACETAMINOPHEN 5; 325 MG/1; MG/1
1 TABLET ORAL EVERY 4 HOURS PRN
Status: DISCONTINUED | OUTPATIENT
Start: 2023-05-16 | End: 2023-05-17 | Stop reason: HOSPADM

## 2023-05-16 RX ORDER — CHLORHEXIDINE GLUCONATE 0.12 MG/ML
15 RINSE ORAL ONCE
Status: COMPLETED | OUTPATIENT
Start: 2023-05-16 | End: 2023-05-16

## 2023-05-16 RX ORDER — LOSARTAN POTASSIUM 25 MG/1
25 TABLET ORAL ONCE
Status: DISCONTINUED | OUTPATIENT
Start: 2023-05-16 | End: 2023-05-16 | Stop reason: HOSPADM

## 2023-05-16 RX ORDER — GABAPENTIN 300 MG/1
300 CAPSULE ORAL 3 TIMES DAILY
Status: DISCONTINUED | OUTPATIENT
Start: 2023-05-16 | End: 2023-05-17 | Stop reason: HOSPADM

## 2023-05-16 RX ORDER — ONDANSETRON 4 MG/1
4 TABLET, FILM COATED ORAL EVERY 6 HOURS PRN
Status: DISCONTINUED | OUTPATIENT
Start: 2023-05-16 | End: 2023-05-17 | Stop reason: HOSPADM

## 2023-05-16 RX ORDER — SODIUM CHLORIDE 0.9 % (FLUSH) 0.9 %
3 SYRINGE (ML) INJECTION EVERY 12 HOURS SCHEDULED
Status: DISCONTINUED | OUTPATIENT
Start: 2023-05-16 | End: 2023-05-16 | Stop reason: HOSPADM

## 2023-05-16 RX ORDER — SODIUM CHLORIDE 9 MG/ML
INJECTION, SOLUTION INTRAVENOUS CONTINUOUS PRN
Status: DISCONTINUED | OUTPATIENT
Start: 2023-05-16 | End: 2023-05-16 | Stop reason: SURG

## 2023-05-16 RX ORDER — HEPARIN SODIUM 1000 [USP'U]/ML
INJECTION, SOLUTION INTRAVENOUS; SUBCUTANEOUS AS NEEDED
Status: DISCONTINUED | OUTPATIENT
Start: 2023-05-16 | End: 2023-05-16 | Stop reason: SURG

## 2023-05-16 RX ORDER — FLUMAZENIL 0.1 MG/ML
0.2 INJECTION INTRAVENOUS AS NEEDED
Status: DISCONTINUED | OUTPATIENT
Start: 2023-05-16 | End: 2023-05-16

## 2023-05-16 RX ORDER — LOSARTAN POTASSIUM 50 MG/1
50 TABLET ORAL
Status: DISCONTINUED | OUTPATIENT
Start: 2023-05-17 | End: 2023-05-17 | Stop reason: HOSPADM

## 2023-05-16 RX ORDER — PROPOFOL 10 MG/ML
VIAL (ML) INTRAVENOUS AS NEEDED
Status: DISCONTINUED | OUTPATIENT
Start: 2023-05-16 | End: 2023-05-16 | Stop reason: SURG

## 2023-05-16 RX ORDER — FENTANYL CITRATE 50 UG/ML
25 INJECTION, SOLUTION INTRAMUSCULAR; INTRAVENOUS
Status: DISCONTINUED | OUTPATIENT
Start: 2023-05-16 | End: 2023-05-16

## 2023-05-16 RX ORDER — IPRATROPIUM BROMIDE AND ALBUTEROL SULFATE 2.5; .5 MG/3ML; MG/3ML
3 SOLUTION RESPIRATORY (INHALATION) ONCE AS NEEDED
Status: DISCONTINUED | OUTPATIENT
Start: 2023-05-16 | End: 2023-05-16

## 2023-05-16 RX ORDER — FAMOTIDINE 10 MG/ML
20 INJECTION, SOLUTION INTRAVENOUS ONCE
Status: COMPLETED | OUTPATIENT
Start: 2023-05-16 | End: 2023-05-16

## 2023-05-16 RX ORDER — HYDROCODONE BITARTRATE AND ACETAMINOPHEN 7.5; 325 MG/1; MG/1
1 TABLET ORAL EVERY 4 HOURS PRN
Status: DISCONTINUED | OUTPATIENT
Start: 2023-05-16 | End: 2023-05-16

## 2023-05-16 RX ORDER — SODIUM CHLORIDE 0.9 % (FLUSH) 0.9 %
3-10 SYRINGE (ML) INJECTION AS NEEDED
Status: DISCONTINUED | OUTPATIENT
Start: 2023-05-16 | End: 2023-05-16 | Stop reason: HOSPADM

## 2023-05-16 RX ORDER — FUROSEMIDE 40 MG/1
40 TABLET ORAL DAILY
Status: DISCONTINUED | OUTPATIENT
Start: 2023-05-16 | End: 2023-05-17 | Stop reason: HOSPADM

## 2023-05-16 RX ORDER — DEXMEDETOMIDINE HYDROCHLORIDE 4 UG/ML
INJECTION, SOLUTION INTRAVENOUS CONTINUOUS PRN
Status: DISCONTINUED | OUTPATIENT
Start: 2023-05-16 | End: 2023-05-16 | Stop reason: SURG

## 2023-05-16 RX ORDER — NALOXONE HCL 0.4 MG/ML
0.2 VIAL (ML) INJECTION AS NEEDED
Status: DISCONTINUED | OUTPATIENT
Start: 2023-05-16 | End: 2023-05-16

## 2023-05-16 RX ORDER — DIPHENHYDRAMINE HYDROCHLORIDE 50 MG/ML
12.5 INJECTION INTRAMUSCULAR; INTRAVENOUS
Status: DISCONTINUED | OUTPATIENT
Start: 2023-05-16 | End: 2023-05-16

## 2023-05-16 RX ORDER — SODIUM CHLORIDE, SODIUM LACTATE, POTASSIUM CHLORIDE, CALCIUM CHLORIDE 600; 310; 30; 20 MG/100ML; MG/100ML; MG/100ML; MG/100ML
9 INJECTION, SOLUTION INTRAVENOUS CONTINUOUS
Status: DISCONTINUED | OUTPATIENT
Start: 2023-05-16 | End: 2023-05-17 | Stop reason: HOSPADM

## 2023-05-16 RX ORDER — ONDANSETRON 2 MG/ML
4 INJECTION INTRAMUSCULAR; INTRAVENOUS ONCE AS NEEDED
Status: DISCONTINUED | OUTPATIENT
Start: 2023-05-16 | End: 2023-05-16

## 2023-05-16 RX ORDER — HYDRALAZINE HYDROCHLORIDE 20 MG/ML
5 INJECTION INTRAMUSCULAR; INTRAVENOUS
Status: DISCONTINUED | OUTPATIENT
Start: 2023-05-16 | End: 2023-05-16 | Stop reason: HOSPADM

## 2023-05-16 RX ORDER — DROPERIDOL 2.5 MG/ML
0.62 INJECTION, SOLUTION INTRAMUSCULAR; INTRAVENOUS
Status: DISCONTINUED | OUTPATIENT
Start: 2023-05-16 | End: 2023-05-16

## 2023-05-16 RX ORDER — HYDROMORPHONE HYDROCHLORIDE 1 MG/ML
0.25 INJECTION, SOLUTION INTRAMUSCULAR; INTRAVENOUS; SUBCUTANEOUS
Status: DISCONTINUED | OUTPATIENT
Start: 2023-05-16 | End: 2023-05-16

## 2023-05-16 RX ORDER — ASCORBIC ACID 500 MG
250 TABLET ORAL DAILY
Status: DISCONTINUED | OUTPATIENT
Start: 2023-05-16 | End: 2023-05-17 | Stop reason: HOSPADM

## 2023-05-16 RX ORDER — DEXMEDETOMIDINE HYDROCHLORIDE 100 UG/ML
INJECTION, SOLUTION INTRAVENOUS AS NEEDED
Status: DISCONTINUED | OUTPATIENT
Start: 2023-05-16 | End: 2023-05-16 | Stop reason: SURG

## 2023-05-16 RX ORDER — FENTANYL CITRATE 50 UG/ML
50 INJECTION, SOLUTION INTRAMUSCULAR; INTRAVENOUS ONCE AS NEEDED
Status: DISCONTINUED | OUTPATIENT
Start: 2023-05-16 | End: 2023-05-16 | Stop reason: HOSPADM

## 2023-05-16 RX ORDER — HYDRALAZINE HYDROCHLORIDE 20 MG/ML
INJECTION INTRAMUSCULAR; INTRAVENOUS
Status: COMPLETED
Start: 2023-05-16 | End: 2023-05-16

## 2023-05-16 RX ORDER — NICARDIPINE HYDROCHLORIDE 2.5 MG/ML
INJECTION INTRAVENOUS AS NEEDED
Status: DISCONTINUED | OUTPATIENT
Start: 2023-05-16 | End: 2023-05-16 | Stop reason: SURG

## 2023-05-16 RX ORDER — PROMETHAZINE HYDROCHLORIDE 25 MG/1
25 TABLET ORAL ONCE AS NEEDED
Status: DISCONTINUED | OUTPATIENT
Start: 2023-05-16 | End: 2023-05-16

## 2023-05-16 RX ORDER — ONDANSETRON 2 MG/ML
4 INJECTION INTRAMUSCULAR; INTRAVENOUS EVERY 6 HOURS PRN
Status: DISCONTINUED | OUTPATIENT
Start: 2023-05-16 | End: 2023-05-17 | Stop reason: HOSPADM

## 2023-05-16 RX ADMIN — HEPARIN SODIUM 10000 UNITS: 1000 INJECTION, SOLUTION INTRAVENOUS; SUBCUTANEOUS at 07:51

## 2023-05-16 RX ADMIN — SODIUM CHLORIDE: 9 INJECTION, SOLUTION INTRAVENOUS at 06:55

## 2023-05-16 RX ADMIN — HEPARIN SODIUM 3000 UNITS: 1000 INJECTION, SOLUTION INTRAVENOUS; SUBCUTANEOUS at 08:27

## 2023-05-16 RX ADMIN — CHLORHEXIDINE GLUCONATE 15 ML: 1.2 SOLUTION ORAL at 06:11

## 2023-05-16 RX ADMIN — PHENYLEPHRINE HYDROCHLORIDE 0.2 MCG/KG/MIN: 10 INJECTION INTRAVENOUS at 07:45

## 2023-05-16 RX ADMIN — NICARDIPINE HYDROCHLORIDE 0.5 MG: 2.5 INJECTION, SOLUTION INTRAVENOUS at 08:10

## 2023-05-16 RX ADMIN — ONDANSETRON 4 MG: 2 INJECTION INTRAMUSCULAR; INTRAVENOUS at 15:47

## 2023-05-16 RX ADMIN — CEFAZOLIN SODIUM 2 G: 2 INJECTION, SOLUTION INTRAVENOUS at 07:15

## 2023-05-16 RX ADMIN — GABAPENTIN 300 MG: 300 CAPSULE ORAL at 15:24

## 2023-05-16 RX ADMIN — MIDAZOLAM 0.5 MG: 1 INJECTION INTRAMUSCULAR; INTRAVENOUS at 07:01

## 2023-05-16 RX ADMIN — FENTANYL CITRATE 12.5 MCG: 50 INJECTION, SOLUTION INTRAMUSCULAR; INTRAVENOUS at 07:39

## 2023-05-16 RX ADMIN — LOSARTAN POTASSIUM 25 MG: 25 TABLET, FILM COATED ORAL at 13:14

## 2023-05-16 RX ADMIN — NITROGLYCERIN 100 MCG: 5 INJECTION, SOLUTION INTRAVENOUS at 08:08

## 2023-05-16 RX ADMIN — PROPOFOL 20 MG: 10 INJECTION, EMULSION INTRAVENOUS at 08:46

## 2023-05-16 RX ADMIN — HYDRALAZINE HYDROCHLORIDE 5 MG: 20 INJECTION INTRAMUSCULAR; INTRAVENOUS at 15:10

## 2023-05-16 RX ADMIN — HYDRALAZINE HYDROCHLORIDE 5 MG: 20 INJECTION INTRAMUSCULAR; INTRAVENOUS at 14:50

## 2023-05-16 RX ADMIN — MIDAZOLAM 0.5 MG: 1 INJECTION INTRAMUSCULAR; INTRAVENOUS at 07:15

## 2023-05-16 RX ADMIN — NICARDIPINE HYDROCHLORIDE 0.5 MG: 2.5 INJECTION, SOLUTION INTRAVENOUS at 08:04

## 2023-05-16 RX ADMIN — LOSARTAN POTASSIUM 25 MG: 25 TABLET, FILM COATED ORAL at 17:26

## 2023-05-16 RX ADMIN — FAMOTIDINE 20 MG: 10 INJECTION INTRAVENOUS at 06:30

## 2023-05-16 RX ADMIN — NITROGLYCERIN 100 MCG: 5 INJECTION, SOLUTION INTRAVENOUS at 08:05

## 2023-05-16 RX ADMIN — Medication 10 MG: at 07:01

## 2023-05-16 RX ADMIN — NITROGLYCERIN 50 MCG: 5 INJECTION, SOLUTION INTRAVENOUS at 08:03

## 2023-05-16 RX ADMIN — PROPOFOL 20 MG: 10 INJECTION, EMULSION INTRAVENOUS at 07:20

## 2023-05-16 RX ADMIN — Medication 10 MG: at 07:15

## 2023-05-16 RX ADMIN — PROTAMINE SULFATE 50 MG: 10 INJECTION, SOLUTION INTRAVENOUS at 08:20

## 2023-05-16 RX ADMIN — PROPOFOL 10 MCG/KG/MIN: 10 INJECTION, EMULSION INTRAVENOUS at 07:15

## 2023-05-16 RX ADMIN — SODIUM CHLORIDE 50 ML/HR: 9 INJECTION, SOLUTION INTRAVENOUS at 15:56

## 2023-05-16 RX ADMIN — DEXMEDETOMIDINE HYDROCHLORIDE 0.5 MCG/KG/HR: 4 INJECTION, SOLUTION INTRAVENOUS at 07:15

## 2023-05-16 RX ADMIN — FENTANYL CITRATE 12.5 MCG: 50 INJECTION, SOLUTION INTRAMUSCULAR; INTRAVENOUS at 08:18

## 2023-05-16 RX ADMIN — GABAPENTIN 300 MG: 300 CAPSULE ORAL at 21:01

## 2023-05-16 RX ADMIN — HYDRALAZINE HYDROCHLORIDE 5 MG: 20 INJECTION INTRAMUSCULAR; INTRAVENOUS at 15:00

## 2023-05-16 RX ADMIN — DEXMEDETOMIDINE 31 MCG: 100 INJECTION, SOLUTION, CONCENTRATE INTRAVENOUS at 07:03

## 2023-05-16 NOTE — ANESTHESIA POSTPROCEDURE EVALUATION
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        Post Anesthesia Care and Evaluation    Patient location during evaluation: bedside  Patient participation: complete - patient participated  Level of consciousness: sleepy but conscious  Pain score: 0  Pain management: adequate    Airway patency: patent  Anesthetic complications: No anesthetic complications    Cardiovascular status: acceptable  Respiratory status: acceptable  Hydration status: acceptable    Comments: /66   Pulse 58   Temp 36.3 °C (97.4 °F) (Oral)   Resp 16   Ht 152.4 cm (60\")   Wt 62.1 kg (136 lb 14.5 oz)   SpO2 98%   BMI 26.74 kg/m²         "

## 2023-05-16 NOTE — PLAN OF CARE
Goal Outcome Evaluation:    Received pt from PACU awake and alert and oriented  and and follows commands     Chandra femoral site soft no hematoma noted, small old blood stain noted on the left but no bleeding noted. Vitals stable will cont to monitor pt overnight

## 2023-05-16 NOTE — ANESTHESIA PROCEDURE NOTES
Arterial Line      Patient reassessed immediately prior to procedure    Patient location during procedure: OR   Line placed for hemodynamic monitoring and ABGs/Labs/ISTAT.  Performed By   Anesthesiologist: Peng Patel MD   Preanesthetic Checklist  Completed: patient identified, IV checked, site marked, risks and benefits discussed, surgical consent, monitors and equipment checked, pre-op evaluation and timeout performed  Arterial Line Prep    Sterile Tech: cap, gloves, sterile barriers and mask  Prep: ChloraPrep  Patient monitoring: EKG, continuous pulse oximetry and blood pressure monitoring  Arterial Line Procedure   Laterality:left  Location:  radial artery  Catheter size: 20 G   Guidance: ultrasound guided  PROCEDURE NOTE/ULTRASOUND INTERPRETATION.  Using ultrasound guidance the potential vascular sites for insertion of the catheter were visualized to determine the patency of the vessel to be used for vascular access.  After selecting the appropriate site for insertion, the needle was visualized under ultrasound being inserted into the radial artery, followed by ultrasound confirmation of wire and catheter placement. There were no abnormalities seen on ultrasound; an image was taken; and the patient tolerated the procedure with no complications.   Number of attempts: 2  Successful placement: yes Images: still images not obtained  Post Assessment   Dressing Type: wrist guard applied, secured with tape and occlusive dressing applied.   Complications no  Circ/Move/Sens Assessment: normal and unchanged.   Patient Tolerance: patient tolerated the procedure well with no apparent complications

## 2023-05-17 ENCOUNTER — READMISSION MANAGEMENT (OUTPATIENT)
Dept: CALL CENTER | Facility: HOSPITAL | Age: 83
End: 2023-05-17
Payer: MEDICARE

## 2023-05-17 ENCOUNTER — APPOINTMENT (OUTPATIENT)
Dept: CARDIOLOGY | Facility: HOSPITAL | Age: 83
End: 2023-05-17
Payer: MEDICARE

## 2023-05-17 VITALS
HEIGHT: 60 IN | DIASTOLIC BLOOD PRESSURE: 59 MMHG | OXYGEN SATURATION: 98 % | WEIGHT: 136 LBS | SYSTOLIC BLOOD PRESSURE: 170 MMHG | HEART RATE: 72 BPM | RESPIRATION RATE: 17 BRPM | BODY MASS INDEX: 26.7 KG/M2 | TEMPERATURE: 98.8 F

## 2023-05-17 LAB
ANION GAP SERPL CALCULATED.3IONS-SCNC: 6 MMOL/L (ref 5–15)
AORTIC ARCH: 3.2 CM
AORTIC DIMENSIONLESS INDEX: 0.4 (DI)
BH CV ECHO MEAS - AO MAX PG: 24.3 MMHG
BH CV ECHO MEAS - AO MEAN PG: 14.3 MMHG
BH CV ECHO MEAS - AO V2 MAX: 246.3 CM/SEC
BH CV ECHO MEAS - AO V2 VTI: 53.8 CM
BH CV ECHO MEAS - AVA(I,D): 1.04 CM2
BH CV ECHO MEAS - EDV(CUBED): 79.7 ML
BH CV ECHO MEAS - EDV(MOD-SP2): 56 ML
BH CV ECHO MEAS - EDV(MOD-SP4): 51 ML
BH CV ECHO MEAS - EF(MOD-BP): 59.5 %
BH CV ECHO MEAS - EF(MOD-SP2): 64.3 %
BH CV ECHO MEAS - EF(MOD-SP4): 54.9 %
BH CV ECHO MEAS - ESV(CUBED): 25.1 ML
BH CV ECHO MEAS - ESV(MOD-SP2): 20 ML
BH CV ECHO MEAS - ESV(MOD-SP4): 23 ML
BH CV ECHO MEAS - FS: 32 %
BH CV ECHO MEAS - IVS/LVPW: 1.11 CM
BH CV ECHO MEAS - IVSD: 1.34 CM
BH CV ECHO MEAS - LAT PEAK E' VEL: 9.8 CM/SEC
BH CV ECHO MEAS - LV MASS(C)D: 202.2 GRAMS
BH CV ECHO MEAS - LV MAX PG: 4 MMHG
BH CV ECHO MEAS - LV MEAN PG: 2.37 MMHG
BH CV ECHO MEAS - LV V1 MAX: 99.8 CM/SEC
BH CV ECHO MEAS - LV V1 VTI: 20.5 CM
BH CV ECHO MEAS - LVIDD: 4.3 CM
BH CV ECHO MEAS - LVIDS: 2.9 CM
BH CV ECHO MEAS - LVOT AREA: 2.7 CM2
BH CV ECHO MEAS - LVOT DIAM: 1.86 CM
BH CV ECHO MEAS - LVPWD: 1.21 CM
BH CV ECHO MEAS - MED PEAK E' VEL: 5.5 CM/SEC
BH CV ECHO MEAS - MR MAX PG: 149.2 MMHG
BH CV ECHO MEAS - MR MAX VEL: 610.8 CM/SEC
BH CV ECHO MEAS - MR MEAN PG: 97 MMHG
BH CV ECHO MEAS - MR MEAN VEL: 470.1 CM/SEC
BH CV ECHO MEAS - MR VTI: 193.7 CM
BH CV ECHO MEAS - MV A DUR: 0.14 SEC
BH CV ECHO MEAS - MV A MAX VEL: 124.9 CM/SEC
BH CV ECHO MEAS - MV DEC SLOPE: 442 CM/SEC2
BH CV ECHO MEAS - MV DEC TIME: 301 MSEC
BH CV ECHO MEAS - MV E MAX VEL: 136 CM/SEC
BH CV ECHO MEAS - MV E/A: 1.09
BH CV ECHO MEAS - MV MAX PG: 6.9 MMHG
BH CV ECHO MEAS - MV MEAN PG: 4 MMHG
BH CV ECHO MEAS - MV P1/2T: 92.7 MSEC
BH CV ECHO MEAS - MV V2 VTI: 37.2 CM
BH CV ECHO MEAS - MVA(P1/2T): 2.37 CM2
BH CV ECHO MEAS - MVA(VTI): 1.5 CM2
BH CV ECHO MEAS - PA ACC TIME: 0.07 SEC
BH CV ECHO MEAS - PA PR(ACCEL): 46.9 MMHG
BH CV ECHO MEAS - PA V2 MAX: 130.3 CM/SEC
BH CV ECHO MEAS - PULM A REVS DUR: 0.13 SEC
BH CV ECHO MEAS - PULM A REVS VEL: 43.2 CM/SEC
BH CV ECHO MEAS - PULM DIAS VEL: 66 CM/SEC
BH CV ECHO MEAS - PULM S/D: 0.64
BH CV ECHO MEAS - PULM SYS VEL: 42.2 CM/SEC
BH CV ECHO MEAS - RAP SYSTOLE: 3 MMHG
BH CV ECHO MEAS - RV MAX PG: 4.9 MMHG
BH CV ECHO MEAS - RV V1 MAX: 110.8 CM/SEC
BH CV ECHO MEAS - RV V1 VTI: 28.6 CM
BH CV ECHO MEAS - RVSP: 34 MMHG
BH CV ECHO MEAS - SV(LVOT): 55.9 ML
BH CV ECHO MEAS - SV(MOD-SP2): 36 ML
BH CV ECHO MEAS - SV(MOD-SP4): 28 ML
BH CV ECHO MEAS - TAPSE (>1.6): 2.1 CM
BH CV ECHO MEAS - TR MAX PG: 30.7 MMHG
BH CV ECHO MEAS - TR MAX VEL: 276.9 CM/SEC
BH CV ECHO MEASUREMENTS AVERAGE E/E' RATIO: 17.78
BH CV XLRA - RV BASE: 3.4 CM
BH CV XLRA - RV LENGTH: 7.1 CM
BH CV XLRA - RV MID: 3 CM
BH CV XLRA - TDI S': 16.9 CM/SEC
BUN SERPL-MCNC: 18 MG/DL (ref 8–23)
BUN/CREAT SERPL: 16.5 (ref 7–25)
CALCIUM SPEC-SCNC: 8.3 MG/DL (ref 8.6–10.5)
CHLORIDE SERPL-SCNC: 107 MMOL/L (ref 98–107)
CHOLEST SERPL-MCNC: 158 MG/DL (ref 0–200)
CO2 SERPL-SCNC: 27 MMOL/L (ref 22–29)
CREAT SERPL-MCNC: 1.09 MG/DL (ref 0.57–1)
DEPRECATED RDW RBC AUTO: 45.4 FL (ref 37–54)
EGFRCR SERPLBLD CKD-EPI 2021: 50.2 ML/MIN/1.73
ERYTHROCYTE [DISTWIDTH] IN BLOOD BY AUTOMATED COUNT: 13.3 % (ref 12.3–15.4)
GLUCOSE SERPL-MCNC: 96 MG/DL (ref 65–99)
HCT VFR BLD AUTO: 36.4 % (ref 34–46.6)
HDLC SERPL-MCNC: 40 MG/DL (ref 40–60)
HGB BLD-MCNC: 11.8 G/DL (ref 12–15.9)
LDLC SERPL CALC-MCNC: 102 MG/DL (ref 0–100)
LDLC/HDLC SERPL: 2.54 {RATIO}
LEFT ATRIUM VOLUME INDEX: 42.1 ML/M2
MAXIMAL PREDICTED HEART RATE: 136 BPM
MCH RBC QN AUTO: 29.9 PG (ref 26.6–33)
MCHC RBC AUTO-ENTMCNC: 32.4 G/DL (ref 31.5–35.7)
MCV RBC AUTO: 92.4 FL (ref 79–97)
PLATELET # BLD AUTO: 215 10*3/MM3 (ref 140–450)
PMV BLD AUTO: 9.6 FL (ref 6–12)
POTASSIUM SERPL-SCNC: 4.2 MMOL/L (ref 3.5–5.2)
QT INTERVAL: 411 MS
RBC # BLD AUTO: 3.94 10*6/MM3 (ref 3.77–5.28)
SODIUM SERPL-SCNC: 140 MMOL/L (ref 136–145)
STRESS TARGET HR: 116 BPM
TRIGL SERPL-MCNC: 83 MG/DL (ref 0–150)
VLDLC SERPL-MCNC: 16 MG/DL (ref 5–40)
WBC NRBC COR # BLD: 7.19 10*3/MM3 (ref 3.4–10.8)

## 2023-05-17 PROCEDURE — 93010 ELECTROCARDIOGRAM REPORT: CPT | Performed by: INTERNAL MEDICINE

## 2023-05-17 PROCEDURE — 99232 SBSQ HOSP IP/OBS MODERATE 35: CPT | Performed by: INTERNAL MEDICINE

## 2023-05-17 PROCEDURE — 85027 COMPLETE CBC AUTOMATED: CPT | Performed by: INTERNAL MEDICINE

## 2023-05-17 PROCEDURE — 80061 LIPID PANEL: CPT | Performed by: INTERNAL MEDICINE

## 2023-05-17 PROCEDURE — 80048 BASIC METABOLIC PNL TOTAL CA: CPT | Performed by: INTERNAL MEDICINE

## 2023-05-17 PROCEDURE — 93306 TTE W/DOPPLER COMPLETE: CPT | Performed by: INTERNAL MEDICINE

## 2023-05-17 PROCEDURE — 93306 TTE W/DOPPLER COMPLETE: CPT

## 2023-05-17 PROCEDURE — 93005 ELECTROCARDIOGRAM TRACING: CPT | Performed by: INTERNAL MEDICINE

## 2023-05-17 RX ORDER — LOSARTAN POTASSIUM 50 MG/1
50 TABLET ORAL
Qty: 30 TABLET | Refills: 2 | Status: SHIPPED | OUTPATIENT
Start: 2023-05-18 | End: 2023-08-16

## 2023-05-17 RX ORDER — CLOPIDOGREL BISULFATE 75 MG/1
75 TABLET ORAL DAILY
Qty: 30 TABLET | Refills: 2 | Status: SHIPPED | OUTPATIENT
Start: 2023-05-17 | End: 2023-05-23 | Stop reason: SDUPTHER

## 2023-05-17 RX ADMIN — LOSARTAN POTASSIUM 50 MG: 50 TABLET, FILM COATED ORAL at 08:23

## 2023-05-17 RX ADMIN — OXYCODONE HYDROCHLORIDE AND ACETAMINOPHEN 250 MG: 500 TABLET ORAL at 08:23

## 2023-05-17 RX ADMIN — ASPIRIN 81 MG: 81 TABLET, COATED ORAL at 08:24

## 2023-05-17 RX ADMIN — GABAPENTIN 300 MG: 300 CAPSULE ORAL at 08:24

## 2023-05-17 RX ADMIN — LEVOTHYROXINE SODIUM 50 MCG: 0.05 TABLET ORAL at 08:24

## 2023-05-17 RX ADMIN — FLUOXETINE HYDROCHLORIDE 20 MG: 20 CAPSULE ORAL at 08:24

## 2023-05-17 RX ADMIN — Medication 100 UNITS: at 08:24

## 2023-05-17 RX ADMIN — FUROSEMIDE 40 MG: 40 TABLET ORAL at 08:24

## 2023-05-17 NOTE — DISCHARGE SUMMARY
Date of Admission: 5/16/2023  Date of Discharge:  5/17/2023    Discharge Diagnosis:   - severe aortic stenosis s/p TAVR  - chronic diastolic congestive heart failure  - hypertension  - hypothyroidism  - alopecia      Presenting Problem/History of Present Illness  Aortic stenosis, severe [I35.0]  Diastolic CHF due to valvular disease [I50.30, I38]     Hospital Course  Patient is a 82 y.o. female presented for previously scheduled cardiac surgery. On 5/16/23 she underwent transcatheter aortic valve replacement with Dr. Cohen and Dr. Amos (see op note for full report). Post-operatively she did well. She recovered in the PACU, [facility]. Since admission her blood pressure has been elevated and she was started on losartan with improvement. Echocardiogram reviewed by Dr. Amos, which showed well functioning TAVR valve. She is being discharged on aspirin and Plavix per cardiology recommendations. She is ambulating without issue, and is tolerating the current medication regimen. She is eating and drinking sufficiently, and is urinating without difficulty. She was weaned from oxygen. On POD#1 she was deemed ready for discharge home with family. She is to follow up with NORBERTO Abel in 1 week, and Dr. Amos in 1 month.    Procedures Performed  Procedure(s):  TRANSFEMORAL TRANSCATHETER AORTIC VALVE REPLACEMENT w/ INTRA OP TTE  Transfemoral Transcatheter Aortic Valve Replacement w/Intra Op TTE and Possible Open Rescue Surgery        Consults:   Consults       No orders found for last 30 day(s).            Pertinent Test Results:    Lab Results   Component Value Date    WBC 7.19 05/17/2023    HGB 11.8 (L) 05/17/2023    HCT 36.4 05/17/2023    MCV 92.4 05/17/2023     05/17/2023      Lab Results   Component Value Date    GLUCOSE 96 05/17/2023    CALCIUM 8.3 (L) 05/17/2023     05/17/2023    K 4.2 05/17/2023    CO2 27.0 05/17/2023     05/17/2023     BUN 18 05/17/2023    CREATININE 1.09 (H) 05/17/2023    BCR 16.5 05/17/2023    ANIONGAP 6.0 05/17/2023     Lab Results   Component Value Date    INR 1.06 05/12/2023    PROTIME 13.9 05/12/2023         Condition on Discharge:  Stable    Vital Signs  Temp:  [98 °F (36.7 °C)-98.8 °F (37.1 °C)] 98.8 °F (37.1 °C)  Heart Rate:  [57-88] 72  Resp:  [14-20] 17  BP: ()/() 170/59      Discharge Disposition  Home or Self Care    Discharge Medications     Discharge Medications        New Medications        Instructions Start Date   clopidogrel 75 MG tablet  Commonly known as: PLAVIX   75 mg, Oral, Daily      losartan 50 MG tablet  Commonly known as: COZAAR   50 mg, Oral, Every 24 Hours Scheduled   Start Date: May 18, 2023            Changes to Medications        Instructions Start Date   aspirin 81 MG EC tablet  What changed: additional instructions   81 mg, Oral, Daily             Continue These Medications        Instructions Start Date   Citrus Calcium/Vitamin D 200-6.25 MG-MCG tablet   Oral, Daily, HOLD FOR SURGERY      FLUoxetine 20 MG capsule  Commonly known as: PROzac   TAKE 1 CAPSULE BY MOUTH ONCE DAILY      fluticasone 50 MCG/ACT nasal spray  Commonly known as: FLONASE   As Needed for Allergies.      folic acid-vit B6-vit B12 0.8-10-0.115 MG tablet tablet  Commonly known as: FOLTABS   Oral, Daily, HOLD FOR SURGERY      furosemide 40 MG tablet  Commonly known as: LASIX   40 mg, Oral, Daily      gabapentin 300 MG capsule  Commonly known as: NEURONTIN   300 mg, Oral, 3 Times Daily      levothyroxine 25 MCG tablet  Commonly known as: SYNTHROID, LEVOTHROID   50 mcg, Oral, Daily      Linoleic Acid-Sunflower Oil 500-1000 MG capsule   Oral, HOLD FOR SURGERY      liothyronine 5 MCG tablet  Commonly known as: CYTOMEL   TAKE 1 TABLET(5 MCG) BY MOUTH 1 TIME EACH DAY      metroNIDAZOLE 0.75 % cream  Commonly known as: METROCREAM   APPLY TO AREAS OF SKIN AFFECTED BY ROSACEA EVERY DAY      multivitamin tablet tablet    Oral, Daily, HOLD FOR SURGERY      Potassium 99 MG tablet   Oral, Take As Directed, PRIOR TO SURGERY      valACYclovir 500 MG tablet  Commonly known as: VALTREX   500 mg, Oral, Daily      vitamin C 250 MG tablet  Commonly known as: ASCORBIC ACID   250 mg, Oral, Daily, HOLD FOR SURGERY      vitamin E 100 UNIT capsule   100 Units, Oral, Daily, HOLD FOR SURGERY             Stop These Medications      chlorhexidine 0.12 % solution  Commonly known as: PERIDEX     mupirocin 2 % nasal ointment  Commonly known as: BACTROBAN              Discharge Diet:     Activity at Discharge:   Activity Instructions       Activity as Tolerated      Measure Weight      Daily weight, notify if over 3 lbs in one day            Follow-up Appointments  Future Appointments   Date Time Provider Department Center   5/23/2023 11:30 AM Melissa Kam APRN [facility]   6/19/2023 10:00 AM [facility] RM 1  [facility]   6/19/2023 10:20 AM Holland Amos MD [facility]   5/6/2024  2:45 PM [facility] FRONT RM  [facility]   5/6/2024  3:20 PM Holland Amos MD [facility]     Additional Instructions for the Follow-ups that You Need to Schedule       Ambulatory Referral to Cardiac Rehab   As directed      Discharge Follow-up with Specialty: Dr. Amos; 1 Month   As directed      Specialty: Dr. Amos    Follow Up: 1 Month         Discharge Follow-up with Specified Provider: NORBERTO Abel   As directed      To: NORBERTO Abel                 Test Results Pending at Discharge       NORBERTO Balderas  05/17/23  11:06 EDT  Addendum  Was seen and examined by me, agree with the findings above.  I reviewed the echocardiogram and see any evidence of significant leaks.  Both groins look soft and there is no evidence of distal ischemia.  Okay for discharge  Cole Cohen MD

## 2023-05-17 NOTE — PROGRESS NOTES
"Patient Care Team:  Aydin Willams MD as PCP - General (Family Medicine)    Chief Complaint: Follow-up transcatheter aortic valve replacement    Interval History:   No new complaints.  Resting    Objective   Vital Signs  Temp:  [97.4 °F (36.3 °C)-98.8 °F (37.1 °C)] 98.8 °F (37.1 °C)  Heart Rate:  [55-88] 72  Resp:  [12-20] 17  BP: ()/() 170/59    Intake/Output Summary (Last 24 hours) at 5/17/2023 0854  Last data filed at 5/17/2023 0830  Gross per 24 hour   Intake 760 ml   Output 1100 ml   Net -340 ml     Flowsheet Rows    Flowsheet Row First Filed Value   Admission Height 152.4 cm (60\") Documented at 05/16/2023 0549   Admission Weight 62.1 kg (136 lb 14.5 oz) Documented at 05/16/2023 0549          Temp:  [97.4 °F (36.3 °C)-98.8 °F (37.1 °C)] 98.8 °F (37.1 °C)  Heart Rate:  [55-88] 72  Resp:  [12-20] 17  BP: ()/() 170/59    Intake/Output Summary (Last 24 hours) at 5/17/2023 0854  Last data filed at 5/17/2023 0830  Gross per 24 hour   Intake 760 ml   Output 1100 ml   Net -340 ml     Flowsheet Rows    Flowsheet Row First Filed Value   Admission Height 152.4 cm (60\") Documented at 05/16/2023 0549   Admission Weight 62.1 kg (136 lb 14.5 oz) Documented at 05/16/2023 0549          General Appearance:    Alert, cooperative, in no acute distress   Head:    Normocephalic, without obvious abnormality, atraumatic       Neck/Lymph   No adenopathy, supple, no thyromegaly, no carotid bruit, no    JVD   Lungs:     Clear to auscultation bilaterally, no wheezes, rales, or     rhonchi    Cardiac:    Normal rate, regular rhythm, no murmur, no rub, no gallop   Chest Wall:    No abnormalities observed   GI:     Normal bowel sounds, soft, nontender, nondistended,            no rebound tenderness   Extremities:   No cyanosis, clubbing, or edema   Circulatory/Peripheral Vascular :   Pulses palpable and equal bilaterally   Integumentary:   No bleeding or rash. Normal temperature         vitamin C, 250 mg, Oral, " Daily  aspirin, 81 mg, Oral, Daily  FLUoxetine, 20 mg, Oral, Daily  furosemide, 40 mg, Oral, Daily  gabapentin, 300 mg, Oral, TID  levothyroxine, 50 mcg, Oral, Daily  losartan, 50 mg, Oral, Q24H  vitamin E, 100 Units, Oral, Daily        lactated ringers, 9 mL/hr        Results Review:    Results from last 7 days   Lab Units 05/17/23  0304   SODIUM mmol/L 140   POTASSIUM mmol/L 4.2   CHLORIDE mmol/L 107   CO2 mmol/L 27.0   BUN mg/dL 18   CREATININE mg/dL 1.09*   GLUCOSE mg/dL 96   CALCIUM mg/dL 8.3*         Results from last 7 days   Lab Units 05/17/23  0304   WBC 10*3/mm3 7.19   HEMOGLOBIN g/dL 11.8*   HEMATOCRIT % 36.4   PLATELETS 10*3/mm3 215     Results from last 7 days   Lab Units 05/12/23  0714   INR  1.06   APTT seconds 30.9     Results from last 7 days   Lab Units 05/17/23  0304   CHOLESTEROL mg/dL 158         Results from last 7 days   Lab Units 05/17/23  0304   CHOLESTEROL mg/dL 158   TRIGLYCERIDES mg/dL 83   HDL CHOL mg/dL 40   LDL CHOL mg/dL 102*     @LABRCNT(bnp)@  I reviewed the patient's new clinical results.  I personally viewed and interpreted the patient's EKG/Telemetry data          Assessment & Plan   1.  Severe degenerative aortic valve stenosis: Status post transcatheter aortic valve replacement with 23 mm sapient valve  2.  Chronic heart failure with preserved ejection fraction  3.  Essential hypertension: No medications as outpatient.  Continue on losartan.  Dose was increased today.    -Aspirin and Plavix on discharge.  Transthoracic echocardiogram ordered.  Valve is well-seated with no paravalvular regurgitation.  Mean gradient acceptable.  Okay for discharge home today.

## 2023-05-17 NOTE — PLAN OF CARE
Goal Outcome Evaluation:  Plan of Care Reviewed With: patient        Progress: improving  Outcome Evaluation: Patient s/p TAVR with bilateral groin sites. Patient blood pressure /48-93 HR 64-87 SR on monitor. Patient groin sites soft and noted dried drainage on her left groin. Patient to have echo this am and possibly discharge home. Will continue to monitor and await discharge plans.

## 2023-05-17 NOTE — OP NOTE
Paged On Call Dr. Shukla: should we hold heparin subcutaneous for surgery for patient?   TAVR OPERATIVE REPORT    CO-SURGEON: Cole Cohen MD  CO-SURGEON: Ricky Amos MD    DIAGNOSIS: Severe symptomatic aortic stenosis.     POSTOP DIAGNOSIS: Severe symptomatic aortic stenosis.     PRESENT CO-MORBIDITIES:   Severe AAS, severe MAC, CHF class III, hypertension, hypothyroidism, alopecia, frailty    PROCEDURE: Elective procedure in hybrid operating room     1. Transcatheter aortic valve replacement (TAVR) utilizing a 14 [place] eSheath and a 23 mm Whitaker ultra transcatheter heart valve  2. Percutaneous left femoral arterial access   3. Percutaneous right femoral arterial and venous access  4. Abdominal aortography and bilateral lower extremity angiography  5. Transvenous pacing using a 5-[place] balloon-tip pacer  6. Supravalvular aortogram  7. Aortic valvuloplasty using 20 mm x 4 cm balloon catheter  8. Trans thoracic echocardiogram  9. Arterial line placement  10. Central venous catheter placement      INDICATIONS FOR OPERATION: The patient is a 82-year-old with New York Heart Association Class 3 symptoms and STS score of 3.7%. The patient was determined to be best suited for TAVR by the multidisciplinary heart team due to surgical risk and frailty    FDA TAVR INDICATIONS: The patient was judged to be suitable for TAVR by the multidisciplinary team as reviewed by two cardiac surgeons, an interventional cardiologist, and the rest of the TAVR team.  The patient, family and team were in agreement that the case would convert to an open surgical AVR in the event of unsuccessful TAVR. The patient’s co-morbidities would not preclude the expected benefit from correction of the aortic stenosis by TAVR based on the team discussion. The patient will be enrolled in the STS/ACC TAVR TVT registry.     DESCRIPTION: Dr. Amos (interventional cardiologist) and Dr. Cohen (cardiothoracic surgeon) performed the procedure together in all preoperative and operative aspects. The patient was taken to the hybrid OR. A radial  "art line, central venous access, and Hartmann catheter were inserted preoperatively. Anesthesia was administered without apparent complication. The patient was prepped and draped in the usual sterile fashion.      Using a micropuncture technique, access was obtained in the right femoral vein and a microsheath was inserted.  Angiography confirmed access in the vein.  A 6 [place] sheath was inserted.  A similar technique was used to obtain access in the right common femoral artery.  Angiography through the microsheath confirmed good position of the arterial access point.  A 6 [place] sheath was inserted.  2 Perclose sutures were deployed in offset manner in the right femoral artery using the \"Preclose\" technique.  An 8 [place] sheath was inserted.  A similar technique micropuncture technique was used to obtain access in the left common femoral artery and to confirm position.  A 6 [place] pigtail was directed to the right coronary cusp.  Angiography was performed using 20 cc of contrast. A 5 [place] pacing catheter was directed to the RV apex and was tested.      We inserted an 5 Fr FR 4 catheter to the aortic arch.  Through this, we inserted a Lunderquist wire.  Over this we performed sequential dilation until a 14 [place] E-sheath could be inserted to the abdominal aorta.  We exchanged for an 6 [place] AL-1 catheter and crossed the valve easily using a straight wire.  We exchanged for a pigtail catheter in the LV, then optimized position in the LV apex.  We then inserted an Amplatz Extra Stiff guidewire.   Aortography was performed  to confirm the deployment angle and position of the TAVR valve. The orientation of a 23 mm Whitaker ultra TAVR valve was confirmed by multiple physicians, then was loaded in the sheath and was advanced to the descending aorta.  The valve was centered on the balloon easily, then was advanced across the aortic arch.     The THV was then placed and repositioned in the aortic annulus, and placement was " confirmed by an aortogram. The co-operators were all in agreement for valve positioning prior to deployment. In a coordinated fashion, rapid ventricular pacing and the TAVR valve was deployed for a total of 5 seconds.  The valve delivery balloon was then deflated, then was withdrawn and pacing was discontinued.   Proper valve placement, orientation and degree of regurgitation was then evaluated by trans thoracic echocardiogram and aortography. The co-operators agreed that no further intervention was necessary. The transaortic guidewire was then removed through the pigtail catheter. The pigtail catheter was then advanced in the left ventricle where intracardiac pressures were acquired. We then removed our eSheath from our introducer sheath. The introducer sheath was then pulled into the distal external iliac artery with the wire still in place. From the contralateral side, the pigtail catheter was advanced into the distal abdomen and abdominal aortography was performed. All parties were in agreement that there was a flow-limiting vascular stenosis at the insertion site.  At the point, a racquet catheter was advanced from the left femoral into the right iliac and then femoral artery crossing the lesion with a wire and then a catheter was used for support and then the balloon was advanced.  The area with a narrowing was balloon expanded with good anatomic result afterwards.  Completion arteriogram did not showed significant flow-limiting lesion nor extravasation.  The venous sheath and temporary pacemaker were then removed. Finally, our contralateral access was removed and closed using a 6 Malay Angioseal. The patient left in the care of the anesthesia team for extubation and hemodynamic monitoring. The patient was transported to the Open Heart Recovery Unit for further monitoring and care.       1. Hemodynamics:  Post TAVR aortic gradient: 4 mmhg.  Post TAVR AI: None. Post DELPHINE: 1.4 cm2.     CONTRAST USED: Under 190  mL.     FLUROSCOPY TIME: 15.9 min    Air KERMA: 467 m Gray    EBL: minimal    SPECIMENS: None    CONCLUSIONS:  Successful deployment of a 23 mm Whitaker ultra transcatheter aortic heart valve.

## 2023-05-18 NOTE — OUTREACH NOTE
Prep Survey      Flowsheet Row Responses   [facility] facility patient discharged from? [facility]   Is LACE score < 7 ? No   Eligibility Readm Mgmt   Discharge diagnosis TAVR   Does the patient have one of the following disease processes/diagnoses(primary or secondary)? Cardiothoracic surgery   Does the patient have Home health ordered? No   Is there a DME ordered? No   Prep survey completed? Yes            Katelyn FERRIS - Registered Nurse

## 2023-05-23 ENCOUNTER — OFFICE VISIT (OUTPATIENT)
Dept: CARDIOLOGY | Facility: CLINIC | Age: 83
End: 2023-05-23
Payer: MEDICARE

## 2023-05-23 VITALS
DIASTOLIC BLOOD PRESSURE: 98 MMHG | WEIGHT: 135 LBS | HEIGHT: 60 IN | BODY MASS INDEX: 26.5 KG/M2 | OXYGEN SATURATION: 98 % | SYSTOLIC BLOOD PRESSURE: 140 MMHG | HEART RATE: 70 BPM

## 2023-05-23 DIAGNOSIS — I35.0 AORTIC STENOSIS, SEVERE: Primary | ICD-10-CM

## 2023-05-23 DIAGNOSIS — Z95.3 STATUS POST TRANSCATHETER AORTIC VALVE REPLACEMENT (TAVR) USING BIOPROSTHESIS: ICD-10-CM

## 2023-05-23 DIAGNOSIS — I50.30 DIASTOLIC CHF DUE TO VALVULAR DISEASE: ICD-10-CM

## 2023-05-23 DIAGNOSIS — I38 DIASTOLIC CHF DUE TO VALVULAR DISEASE: ICD-10-CM

## 2023-05-23 RX ORDER — FUROSEMIDE 40 MG/1
20 TABLET ORAL DAILY
Qty: 30 TABLET | Refills: 11
Start: 2023-05-23

## 2023-05-23 RX ORDER — CLOPIDOGREL BISULFATE 75 MG/1
75 TABLET ORAL DAILY
Qty: 90 TABLET | Refills: 3 | Status: SHIPPED | OUTPATIENT
Start: 2023-05-23 | End: 2023-08-21

## 2023-05-23 NOTE — PROGRESS NOTES
Date of Office Visit: 2023  Encounter Provider: NORBERTO Spian  Place of Service: [facility]  Patient Name: Sigrid Hobson  :1940    No chief complaint on file.  : one week TAVR follow up    HPI: Sigrid Hobson is a 82 y.o. female who is a patient who recently presented to the emergency room with increasing exertional dyspnea over the last weeks to months.  She walks to [employer] every day and has for many years.  She started noticing that when she would get there she would be very short of breath.  An echocardiogram showed preserved left ventricular systolic function as well as mild mitral regurgitation with severe aortic stenosis.  Measurements showed mean gradient 40 mmHg, peak velocity 4 m/s2.  She was diuresed aggressively as she was found to have left pleural effusion on CXR.  She underwent left heart cath which showed severe ostial diagonal disease, moderate mid circumflex disease.  It was opted to treat patient medically as patient did not have any angina.  Patient was evaluated by structural heart team for TAVR work-up.    She underwent transcatheter aortic valve replacement with 23 mm sapient ultra transcatheter heart valve by Madelaine Amos and Noel on 2023.  Echocardiogram postprocedure showed well-functioning TAVR valve, mean pressure gradient 14 mmHg.  She was discharged on aspirin and Plavix.    Other medical history includes hypertension and hypothyroidism.  She is no longer having shortness of breath on exertion.  She feels fairly well.  She is back to doing all of her activities.  Bilateral groin sites with no complications.  Right side with some hardness but seems to be healing well.  Patient notes that anytime she comes into the doctor's office, her blood pressure is usually higher.  She has well-controlled blood pressure at home.  EKG shows sinus rhythm.  She was continued on Lasix 40 mg daily, however appears the medication  has been making her sick to her stomach when she gets into the shower.  She appears euvolemic on exam.  I will decrease her Lasix to 20 mg daily at this time.    Previous testing and notes have been reviewed by me.   Past Medical History:   Diagnosis Date   • Awareness under anesthesia     STATES HARD TO PUT UNDER   • Heart valve disease    • Hypertension    • Hypothyroid    • Seizure 1980    UNKNOWN CAUSE   • Shingles    • Shortness of breath     UPON EXERTION   • Tremors of nervous system        Past Surgical History:   Procedure Laterality Date   • AORTIC VALVE REPAIR/REPLACEMENT N/A 5/16/2023    Procedure: TRANSFEMORAL TRANSCATHETER AORTIC VALVE REPLACEMENT w/ INTRA OP TTE;  Surgeon: Cole Cohen MD;  Location: [facility] CVOR;  Service: Cardiothoracic;  Laterality: N/A;   • AORTIC VALVE REPAIR/REPLACEMENT N/A 5/16/2023    Procedure: Transfemoral Transcatheter Aortic Valve Replacement w/Intra Op TTE and Possible Open Rescue Surgery ;  Surgeon: Holland Amos MD;  Location: [facility] CVOR;  Service: Cardiovascular;  Laterality: N/A;   • APPENDECTOMY     • CARDIAC CATHETERIZATION Left 05/03/2023    Procedure: Cardiac Catheterization/Vascular Study;  Surgeon: Denita Calloway MD;  Location: [facility] CATH INVASIVE LOCATION;  Service: Cardiovascular;  Laterality: Left;   • CARDIAC CATHETERIZATION N/A 05/03/2023    Procedure: Coronary angiography;  Surgeon: Denita Calloway MD;  Location: [facility] CATH INVASIVE LOCATION;  Service: Cardiovascular;  Laterality: N/A;   • HYSTERECTOMY     • TUBAL ABDOMINAL LIGATION         Social History     Socioeconomic History   • Marital status:    Tobacco Use   • Smoking status: Never     Passive exposure: Never   • Smokeless tobacco: Never   Vaping Use   • Vaping Use: Never used   Substance and Sexual Activity   • Alcohol use: Never   • Drug use: Never   • Sexual activity: Not Currently       Family History   Problem Relation Age of Onset   • Malig Hyperthermia Neg Hx         Review of Systems   Constitutional: Negative.   HENT: Negative.    Eyes: Negative.    Cardiovascular: Negative.    Respiratory: Negative.    Endocrine: Negative.    Hematologic/Lymphatic:        Asa/ plavix   Skin: Negative.    Musculoskeletal: Negative.    Gastrointestinal: Negative.    Genitourinary: Negative.    Neurological: Negative.    Psychiatric/Behavioral: Negative.    Allergic/Immunologic: Negative.        Allergies   Allergen Reactions   • Diazepam Mental Status Change   • Hydrocodone Mental Status Change   • Milk-Related Compounds Diarrhea   • Promethazine Mental Status Change   • Quinine Derivatives Hives         Current Outpatient Medications:   •  aspirin 81 MG EC tablet, Take 1 tablet by mouth Daily., Disp: , Rfl:   •  Calcium Citrate-Vitamin D (Citrus Calcium/Vitamin D) 200-6.25 MG-MCG tablet, Take  by mouth Daily. HOLD FOR SURGERY, Disp: , Rfl:   •  clopidogrel (PLAVIX) 75 MG tablet, Take 1 tablet by mouth Daily for 90 days., Disp: 90 tablet, Rfl: 3  •  FLUoxetine (PROzac) 20 MG capsule, TAKE 1 CAPSULE BY MOUTH ONCE DAILY, Disp: 90 capsule, Rfl: 0  •  fluticasone (FLONASE) 50 MCG/ACT nasal spray, As Needed for Allergies., Disp: , Rfl:   •  folic acid-vit B6-vit B12 (FOLTABS) 0.8-10-0.115 MG tablet tablet, Take  by mouth Daily. HOLD FOR SURGERY, Disp: , Rfl:   •  furosemide (LASIX) 40 MG tablet, Take 0.5 tablets by mouth Daily., Disp: 30 tablet, Rfl: 11  •  gabapentin (NEURONTIN) 300 MG capsule, Take 1 capsule by mouth 3 (Three) Times a Day., Disp: , Rfl:   •  levothyroxine (SYNTHROID, LEVOTHROID) 25 MCG tablet, Take 2 tablets by mouth Daily., Disp: 60 tablet, Rfl: 2  •  Linoleic Acid-Sunflower Oil 500-1000 MG capsule, Take  by mouth. HOLD FOR SURGERY, Disp: , Rfl:   •  liothyronine (CYTOMEL) 5 MCG tablet, TAKE 1 TABLET(5 MCG) BY MOUTH 1 TIME EACH DAY, Disp: , Rfl:   •  losartan (COZAAR) 50 MG tablet, Take 1 tablet by mouth Daily for 90 days., Disp: 30 tablet, Rfl: 2  •  metroNIDAZOLE  "(METROCREAM) 0.75 % cream, APPLY TO AREAS OF SKIN AFFECTED BY ROSACEA EVERY DAY, Disp: , Rfl:   •  Potassium 99 MG tablet, Take  by mouth Take As Directed. PRIOR TO SURGERY, Disp: , Rfl:   •  valACYclovir (VALTREX) 500 MG tablet, Take 1 tablet by mouth Daily., Disp: , Rfl:   •  vitamin C (ASCORBIC ACID) 250 MG tablet, Take 1 tablet by mouth Daily. HOLD FOR SURGERY, Disp: , Rfl:   •  vitamin E 100 UNIT capsule, Take 1 capsule by mouth Daily. HOLD FOR SURGERY, Disp: , Rfl:   •  multivitamin (THERAGRAN) tablet tablet, Take  by mouth Daily. HOLD FOR SURGERY, Disp: , Rfl:       Objective:     Vitals:    05/23/23 1141   BP: 140/98   Pulse: 70   SpO2: 98%   Weight: 61.2 kg (135 lb)   Height: 152.4 cm (60\")     Body mass index is 26.37 kg/m².     2D Echo (post TAVR) 05/17/2023:  •  Left ventricular systolic function is normal. Calculated left ventricular EF = 59.5%  •  Left ventricular wall thickness is consistent with mild concentric hypertrophy.  •  Left ventricular diastolic function is consistent with (grade II w/high LAP) pseudonormalization.  •  The left atrial cavity is mildly dilated.  •  There is a TAVR valve present.  •  Aortic valve area is 1 cm2.  •  Peak velocity of the flow distal to the aortic valve is 246.3 cm/s. Aortic valve mean pressure gradient is 14 mmHg.  •  Moderate mitral valve regurgitation is present.  •  Mild mitral valve stenosis is present. The mitral valve mean gradient is 4 mmHg.  •  Estimated right ventricular systolic pressure from tricuspid regurgitation is normal (<35 mmHg    Left Heart Cath 05/08/2023:  LEFT MAIN:Large-caliber, normal.  Bifurcates give the LAD and circumflex arteries.  LAD: The LAD is a large-caliber vessel.  Normal proximal LAD with mild luminal irregularities at the takeoff of the first diagonal.  The remainder of the LAD is normal.  The ostium of the first diagonal has a smooth 80% stenosis, the rest of the vessel is normal.  Ramus: Absent  Circumflex: Large-caliber " nondominant circumflex.  The proximal portion has a mild 30% stenosis before the takeoff of a large, normal, bifurcating OM1.  The ongoing circumflex is normal.  RCA: The RCA is large caliber, dominant and normal.  Segment    2D Echocardiogram 05/01/2023:  •  Left ventricular systolic function is normal. Left ventricular ejection fraction appears to be 61 - 65%.  •  Left ventricular diastolic function is consistent with (grade II w/high LAP) pseudonormalization.  •  The left atrial cavity is moderately dilated.  •  Severe aortic valve stenosis is present. Aortic valve area is 0.46 cm2. Aortic valve maximum pressure gradient is 66 mmHg. Aortic valve mean pressure gradient is 44 mmHg. Aortic valve dimensionless index is 0.20 .  •  Mild aortic valve regurgitation is present.  •  There is moderate to severe mitral annular calcification.  •  Mild mitral valve regurgitation is present.  •  Mild tricuspid valve regurgitation is present.  •  Calculated right ventricular systolic pressure from tricuspid regurgitation is 31 mmHg.  •  There is no evidence of pericardial effusion.    PHYSICAL EXAM:    Constitutional:       Appearance: Healthy appearance. Not in distress.   Neck:      Vascular: No JVR. JVD normal.   Pulmonary:      Effort: Pulmonary effort is normal.      Breath sounds: Normal breath sounds. No wheezing. No rhonchi. No rales.   Chest:      Chest wall: Not tender to palpatation.   Cardiovascular:      PMI at left midclavicular line. Normal rate. Regular rhythm. Normal S1. Normal S2.      Murmurs: There is a systolic murmur.      No gallop. No click. No rub.   Pulses:     Intact distal pulses.   Edema:     Peripheral edema absent.   Abdominal:      General: Bowel sounds are normal.      Palpations: Abdomen is soft.      Tenderness: There is no abdominal tenderness.   Musculoskeletal: Normal range of motion.         General: No tenderness. Skin:     General: Skin is warm and dry.   Neurological:      General: No  focal deficit present.      Mental Status: Alert and oriented to person, place and time.           ECG 12 Lead    Date/Time: 5/23/2023 2:11 PM  Performed by: Melissa Kma APRN  Authorized by: Melissa Kam APRN   Comparison: compared with previous ECG from 5/17/2023  Similar to previous ECG  Rhythm: sinus rhythm  BPM: 70  Conduction: conduction normal  ST Segments: ST segments normal  T Waves: T waves normal  QRS axis: normal    Clinical impression: normal ECG              Assessment:       Diagnosis Plan   1. Aortic stenosis, severe        2. 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 Status post transcatheter aortic valve replacement (TAVR) using bioprosthesis          No orders of the defined types were placed in this encounter.         Plan:       1.  Severe aortic stenosis: Status post TAVR.  Doing well.  Continues on aspirin and Plavix.  Post 2D echo shows well-seated valve and gradients in normal range.    2.  Hypertension: Well-controlled at home  3.  Chronic diastolic heart failure: Normal LVEF,   Moderate mitral regurgitation post TAVR.     One month TAVR follow-up with Dr. Amos along with echocardiogram.  She will call sooner for any questions or concerns.           Your medication list          Accurate as of May 23, 2023  2:07 PM. If you have any questions, ask your nurse or doctor.            CHANGE how you take these medications      Instructions Last Dose Given Next Dose Due   furosemide 40 MG tablet  Commonly known as: LASIX  What changed: how much to take  Changed by: NORBERTO [place]      Take 0.5 tablets by mouth Daily.          CONTINUE taking these medications      Instructions Last Dose Given Next Dose Due   aspirin 81 MG EC tablet      Take 1 tablet by mouth Daily.       Citrus Calcium/Vitamin D 200-6.25 MG-MCG tablet      Take  by mouth Daily. HOLD FOR SURGERY       clopidogrel 75 MG tablet  Commonly known as: PLAVIX      Take 1 tablet by mouth Daily for 90 days.        FLUoxetine 20 MG capsule  Commonly known as: PROzac      TAKE 1 CAPSULE BY MOUTH ONCE DAILY       fluticasone 50 MCG/ACT nasal spray  Commonly known as: FLONASE      As Needed for Allergies.       folic acid-vit B6-vit B12 0.8-10-0.115 MG tablet tablet  Commonly known as: FOLTABS      Take  by mouth Daily. HOLD FOR SURGERY       gabapentin 300 MG capsule  Commonly known as: NEURONTIN      Take 1 capsule by mouth 3 (Three) Times a Day.       levothyroxine 25 MCG tablet  Commonly known as: SYNTHROID, LEVOTHROID      Take 2 tablets by mouth Daily.       Linoleic Acid-Sunflower Oil 500-1000 MG capsule      Take  by mouth. HOLD FOR SURGERY       liothyronine 5 MCG tablet  Commonly known as: CYTOMEL      TAKE 1 TABLET(5 MCG) BY MOUTH 1 TIME EACH DAY       losartan 50 MG tablet  Commonly known as: COZAAR      Take 1 tablet by mouth Daily for 90 days.       metroNIDAZOLE 0.75 % cream  Commonly known as: METROCREAM      APPLY TO AREAS OF SKIN AFFECTED BY ROSACEA EVERY DAY       multivitamin tablet tablet      Take  by mouth Daily. HOLD FOR SURGERY       Potassium 99 MG tablet      Take  by mouth Take As Directed. PRIOR TO SURGERY       valACYclovir 500 MG tablet  Commonly known as: VALTREX      Take 1 tablet by mouth Daily.       vitamin C 250 MG tablet  Commonly known as: ASCORBIC ACID      Take 1 tablet by mouth Daily. HOLD FOR SURGERY       vitamin E 100 UNIT capsule      Take 1 capsule by mouth Daily. HOLD FOR SURGERY             Where to Get Your Medications      These medications were sent to [id number] - [place], [place] - [address] AT [facility] [address] - [phone] [facility] [phone]   [address] , [facility] [address]    Phone: [phone]   · clopidogrel 75 MG tablet     Information about where to get these medications is not yet available    Ask your nurse or doctor about these medications  · furosemide 40 MG tablet           As always, it has been a  pleasure to participate in your patient's care.      Sincerely,       NORBERTO Carreon

## 2023-05-24 ENCOUNTER — READMISSION MANAGEMENT (OUTPATIENT)
Dept: CALL CENTER | Facility: HOSPITAL | Age: 83
End: 2023-05-24
Payer: MEDICARE

## 2023-05-24 NOTE — OUTREACH NOTE
CT Surgery Week 1 Survey    Flowsheet Row Responses   [facility] facility patient discharged from? [facility]   Does the patient have one of the following disease processes/diagnoses(primary or secondary)? Cardiothoracic surgery   Week 1 attempt successful? No  [call to home and then to son who deferred to pt home number, attempted again with no answer]   Call start time [id number]   Unsuccessful attempts Attempt 1   Discharge diagnosis TAVR            AGATA TRUJILLO - Registered Nurse

## 2023-06-07 ENCOUNTER — READMISSION MANAGEMENT (OUTPATIENT)
Dept: CALL CENTER | Facility: HOSPITAL | Age: 83
End: 2023-06-07
Payer: MEDICARE

## 2023-06-07 NOTE — OUTREACH NOTE
CT Surgery Week 3 Survey      Flowsheet Row Responses   [facility] patient discharged from? [facility]   Does the patient have one of the following disease processes/diagnoses(primary or secondary)? Cardiothoracic surgery   Week 3 attempt successful? Yes   Call start time 1203   Call end time 1203   Discharge diagnosis TAVR   Meds reviewed with patient/caregiver? Yes   Is the patient having any side effects they believe may be caused by any medication additions or changes? No   Does the patient have all medications related to this admission filled (includes all antibiotics, pain medications, cardiac medications, etc.) Yes   Is the patient taking all medications as directed (includes completed medication regime)? Yes   Does the patient have a primary care provider?  Yes   Does the patient have an appointment scheduled with their C/T surgeon? Yes   Has the patient kept scheduled appointments due by today? Yes   Has home health visited the patient within 72 hours of discharge? N/A   Psychosocial issues? No   What is the patient's perception of their health status since discharge? Improving   Week 3 call completed? Yes            Sheila FERRIS - Registered Nurse

## 2023-08-14 RX ORDER — LOSARTAN POTASSIUM 50 MG/1
TABLET ORAL
Qty: 30 TABLET | Refills: 2 | OUTPATIENT
Start: 2023-08-14

## 2023-08-17 RX ORDER — CLOPIDOGREL BISULFATE 75 MG/1
TABLET ORAL
Qty: 30 TABLET | OUTPATIENT
Start: 2023-08-17

## 2024-05-07 ENCOUNTER — TELEPHONE (OUTPATIENT)
Dept: CARDIOLOGY | Facility: HOSPITAL | Age: 84
End: 2024-05-07
Payer: MEDICARE

## 2024-05-07 ENCOUNTER — HOSPITAL ENCOUNTER (OUTPATIENT)
Dept: CARDIOLOGY | Facility: HOSPITAL | Age: 84
Discharge: HOME | End: 2024-05-07

## 2024-05-07 DIAGNOSIS — Z95.2 HISTORY OF TRANSCATHETER AORTIC VALVE REPLACEMENT (TAVR): ICD-10-CM

## (undated) DEVICE — Device

## (undated) DEVICE — SPNG LAP 18X18IN LF STRL PK/5

## (undated) DEVICE — TAVR: Brand: MEDLINE INDUSTRIES, INC.

## (undated) DEVICE — RADIFOCUS OPTITORQUE ANGIOGRAPHIC CATHETER: Brand: OPTITORQUE

## (undated) DEVICE — SOL IRR H2O BTL 1000ML STRL

## (undated) DEVICE — PTA BALLOON DILATATION CATHETER: Brand: MUSTANG™

## (undated) DEVICE — CATH DIAG IMPULSE AL1 6F 100CM

## (undated) DEVICE — CATH ELECTRD PACE TEMP BI NONHEP 5F110CM

## (undated) DEVICE — DRAPE,REIN 53X77,STERILE: Brand: MEDLINE

## (undated) DEVICE — 3M™ IOBAN™ 2 ANTIMICROBIAL INCISE DRAPE 6650EZ: Brand: IOBAN™ 2

## (undated) DEVICE — KT MANIFLD CARDIAC

## (undated) DEVICE — SUT GORE TT13 CV5 5N02A

## (undated) DEVICE — SOL ISO/ALC 70PCT 4OZ

## (undated) DEVICE — SOL IRR NACL 0.9PCT BT 1000ML

## (undated) DEVICE — INTRO SHEATH ART/FEM ENGAGE .035 6F12CM

## (undated) DEVICE — GW EMR FIX EXCHG J STD .035 3MM 260CM

## (undated) DEVICE — EQUIPMENT COVER BAG TYPE 48” X 36” (122CM X 91CM): Brand: EQUIPMENT COVER BAG TYPE

## (undated) DEVICE — SUT SILK 2 SUTUPAK TIE 60IN SA8H 2STRAND

## (undated) DEVICE — SNAP KAP: Brand: UNBRANDED

## (undated) DEVICE — PERCLOSE™ PROSTYLE™ SUTURE-MEDIATED CLOSURE AND REPAIR SYSTEM: Brand: PERCLOSE™ PROSTYLE™

## (undated) DEVICE — SOL NACL 0.9PCT 1000ML

## (undated) DEVICE — BIOPATCH™ ANTIMICROBIAL DRESSING WITH CHLORHEXIDINE GLUCONATE IS A HYDROPHILLIC POLYURETHANE ABSORPTIVE FOAM WITH CHLORHEXIDINE GLUCONATE (CHG) WHICH INHIBITS BACTERIAL GROWTH UNDER THE DRESSING. THE DRESSING IS INTENDED TO BE USED TO ABSORB EXUDATE, COVER A WOUND CAUSED BY VASCULAR AND NONVASCULAR PERCUTANEOUS MEDICAL DEVICES DURING SURGERY, AS WELL AS REDUCE LOCAL INFECTION AND COLONIZATION OF MICROORGANISMS.: Brand: BIOPATCH

## (undated) DEVICE — SPNG GZ WOVN 4X4IN 12PLY 10/BX STRL

## (undated) DEVICE — DISPOSABLE ADAPTER

## (undated) DEVICE — HI-TORQUE SUPRA CORE .035 PERIPHERAL GUIDE WIRE .035 X 300 CM: Brand: HI-TORQUE SUPRA CORE

## (undated) DEVICE — GW XCHG AMPLTZ XSTIF PTFE CRV .035 3X260

## (undated) DEVICE — TRANSD PRESS STOPCOCK1WAY CABL48IN

## (undated) DEVICE — TOWEL,OR,DSP,ST,BLUE,STD,4/PK,20PK/CS: Brand: MEDLINE

## (undated) DEVICE — TRY INTRO PERC 6F

## (undated) DEVICE — HI-TORQUE SUPRA CORE .035 PERIPHERAL GUIDE WIRE .035 X 190 CM: Brand: HI-TORQUE SUPRA CORE

## (undated) DEVICE — ST. SORBAVIEW ULTIMATE IJ SYSTEM A,C: Brand: CENTURION

## (undated) DEVICE — SHORT SPIKE VENTED W/3-WAY SC: Brand: MEDLINE INDUSTRIES, INC.

## (undated) DEVICE — PK CATH CARD 40

## (undated) DEVICE — CONTAINER,SPECIMEN,OR STERILE,4OZ: Brand: MEDLINE

## (undated) DEVICE — 3M™ BAIR HUGGER® UNDERBODY BLANKET, FULL ACCESS, 10 PER CASE 63500: Brand: BAIR HUGGER™

## (undated) DEVICE — CVR HNDL LT SURG ACCSSRY BLU STRL

## (undated) DEVICE — CVR PROB 96IN LF STRL

## (undated) DEVICE — SOL NS 500ML

## (undated) DEVICE — RADIFOCUS GLIDEWIRE: Brand: GLIDEWIRE

## (undated) DEVICE — GLV SURG BIOGEL LTX PF 8

## (undated) DEVICE — SENSR CERBRL O2 PK/2

## (undated) DEVICE — CATH DIAG IMPULSE PIG145 6F 110CM

## (undated) DEVICE — GW INQWIRE FC PTFE STR .035IN 150

## (undated) DEVICE — DECANTER BAG 9": Brand: MEDLINE INDUSTRIES, INC.

## (undated) DEVICE — TBG INJ CONTRL PVCCLR RIGD RA 1200PSI 10

## (undated) DEVICE — GLV SURG BIOGEL LTX PF 7 1/2

## (undated) DEVICE — INTRO SHEATH ART/FEM ENGAGE .035 8F12CM

## (undated) DEVICE — STPCK 1WY STD/PRESS SWIVELNUT

## (undated) DEVICE — GLIDESHEATH SLENDER STAINLESS STEEL KIT: Brand: GLIDESHEATH SLENDER

## (undated) DEVICE — CATH DIAG IMPULSE FR4 6F 100CM

## (undated) DEVICE — FR5 INFINITI MULTIPAC: Brand: INFINITI

## (undated) DEVICE — LABEL SHEET CUSTOM 2X2 YELLOW: Brand: MEDLINE INDUSTRIES, INC.

## (undated) DEVICE — TBG PRESS/MONITOR FIX M/F LL A/ 24IN STRL

## (undated) DEVICE — TBG PRESS 96IN M/F ROT BRAID: Brand: MEDLINE INDUSTRIES, INC.

## (undated) DEVICE — STPCK 3/WY PRESS/LO M/ROT/COLAR W/OFF/HNDL 200PSI NS

## (undated) DEVICE — INTRO SHEATH ART/FEM ENGAGE .035 5F12CM

## (undated) DEVICE — DRSNG SURESITE WNDW 2.38X2.75